# Patient Record
Sex: MALE | Race: WHITE | URBAN - METROPOLITAN AREA
[De-identification: names, ages, dates, MRNs, and addresses within clinical notes are randomized per-mention and may not be internally consistent; named-entity substitution may affect disease eponyms.]

---

## 2017-11-20 ENCOUNTER — HOSPITAL ENCOUNTER (OUTPATIENT)
Dept: WOUND CARE | Age: 57
Discharge: HOME | End: 2017-11-20
Attending: PODIATRIST | Admitting: PODIATRIST

## 2017-11-20 VITALS
DIASTOLIC BLOOD PRESSURE: 85 MMHG | TEMPERATURE: 97.2 F | RESPIRATION RATE: 20 BRPM | SYSTOLIC BLOOD PRESSURE: 164 MMHG | HEART RATE: 107 BPM | WEIGHT: 315 LBS | BODY MASS INDEX: 55.35 KG/M2

## 2017-11-20 RX ORDER — LISINOPRIL AND HYDROCHLOROTHIAZIDE 20; 12.5 MG/1; MG/1
2 TABLET ORAL DAILY
COMMUNITY

## 2017-11-20 RX ORDER — LEVOTHYROXINE SODIUM 88 UG/1
88 TABLET ORAL DAILY
COMMUNITY

## 2017-11-20 RX ORDER — AMOXICILLIN AND CLAVULANATE POTASSIUM 500; 125 MG/1; MG/1
1 TABLET, FILM COATED ORAL 2 TIMES DAILY
Qty: 28 TABLET | Refills: 0 | Status: SHIPPED | OUTPATIENT
Start: 2017-11-20 | End: 2017-12-04

## 2017-11-20 NOTE — PROGRESS NOTES
[facility] Progress Note      Dawit Bain     : 1960    DATE OF VISIT:  2017    Subjective:     Dawit Bain is a 61 y.o. male who has a chief complaint of a venous ulcer located on the bilateral leg. Has been wearing compression but weeping became worse and referred to [address] [place] [address],[address]. Current complaint of pain in this ulcer? No.            Mr. Shanelle Pinedo has a past medical history of Cellulitis; Diabetic foot ulcers ([address] and Hypertension. He has a past surgical history that includes other surgical history (Left, 14). His family history includes Diabetes in his brother; Heart Failure in his mother; Hypertension in his father; Stroke in his father. Mr. Shanelle Pinedo reports that he has never smoked. He does not have any smokeless tobacco history on file. He reports that he does not drink alcohol or use drugs. His current medication list consists of Folinic Acid-Vit B6-Vit B12, amoxicillin-clavulanate, levothyroxine, lisinopril-hydrochlorothiazide, metoprolol tartrate, and potassium chloride. Allergies: Review of patient's allergies indicates no known allergies. Pertinent items from the review of systems are discussed in the HPI; the remainder of the ROS was reviewed and is negative.        Objective:     BP (!) 164/85   Pulse 107   Temp 97.2 °F (36.2 °C) (Oral)   Resp 20   Wt (!) 364 lb (165.1 kg)   BMI 55.35 kg/m²   General Appearance: alert and oriented to person, place and time, well developed and well- nourished, in no acute distress  Skin: warm and dry, no rash or erythema  Head: normocephalic and atraumatic  Eyes: pupils equal, round, and reactive to light, extraocular eye movements intact, conjunctivae normal  ENT: tympanic membrane, external ear and ear canal normal bilaterally, nose without deformity, nasal mucosa and turbinates normal without polyps  Neck: supple and non-tender without mass, no thyromegaly or thyroid nodules, no cervical condition(s): venous leg ulcer bilateral, Venous insufficiency, cellulitis right LE. Factors contributing to occurrence and/or persistence of the chronic ulcer include edema, venous stasis, lymphedema, diabetes, poor glucose control, chronic pressure, decreased mobility, shear force and obesity. Sharp debridement is not indicated today, based upon the exam findings in the ulcer(s) above. Discharge plan:     Treatment in the wound care center today:  . Home treatment: good handwashing before and after any dressing changes. Cleanse ulcer with saline or soap & water before dressing change. May use Vaseline (petrolatum), Aquaphor, Aveeno, CeraVe, Cetaphil, Eucerin, Lubriderm, etc for dry skin. Dressing type for home: Per physician order, Bilateral application of multilayer compression wrap was performed in the wound care center today, to help manage edema, stasis dermatitis, and/or venous ulcers. Written discharge instructions given to patient. Offload ulcer(s) as directed. Elevate leg(s) as directed. Follow up in 1 week.        Rx Augmentin        Electronically signed by Laqueta Mcburney, DPM on 11/20/2017 at 3:36 PM.

## 2017-11-22 ENCOUNTER — HOSPITAL ENCOUNTER (OUTPATIENT)
Dept: WOUND CARE | Age: 57
Discharge: HOME | End: 2017-11-22
Attending: PODIATRIST | Admitting: PODIATRIST

## 2017-11-22 ENCOUNTER — HOSPITAL ENCOUNTER (OUTPATIENT)
Dept: WOUND CARE | Age: 57
Discharge: HOME | End: 2017-11-22
Attending: INTERNAL MEDICINE | Admitting: INTERNAL MEDICINE

## 2017-11-22 VITALS
SYSTOLIC BLOOD PRESSURE: 153 MMHG | RESPIRATION RATE: 18 BRPM | DIASTOLIC BLOOD PRESSURE: 72 MMHG | HEART RATE: 100 BPM | TEMPERATURE: 98 F | HEIGHT: 68 IN

## 2017-11-22 DIAGNOSIS — I89.0 LYMPHEDEMA OF BOTH LOWER EXTREMITIES: ICD-10-CM

## 2017-11-22 DIAGNOSIS — I10 ESSENTIAL HYPERTENSION: ICD-10-CM

## 2017-11-22 DIAGNOSIS — E66.01 MORBID OBESITY (HCC): ICD-10-CM

## 2017-11-22 DIAGNOSIS — L92.9 HYPERGRANULATION: ICD-10-CM

## 2017-11-22 PROCEDURE — 17250 CHEM CAUT OF GRANLTJ TISSUE: CPT | Performed by: INTERNAL MEDICINE

## 2017-11-22 PROCEDURE — 29581 APPL MULTLAYER CMPRN SYS LEG: CPT | Performed by: INTERNAL MEDICINE

## 2017-11-22 PROCEDURE — 99213 OFFICE O/P EST LOW 20 MIN: CPT | Performed by: INTERNAL MEDICINE

## 2017-11-22 NOTE — PLAN OF CARE
Problem: Wound:  Goal: Will show signs of wound healing; wound closure and no evidence of infection  Will show signs of wound healing; wound closure and no evidence of infection   Outcome: Ongoing  Pt. With copious amount of drainage noted from BLE & also new areas noted to Right dorsal base of toes. Ag Nitrate used to BLE per Dr. Rashad Murray, Will add Triamcinolone/zinc to reddened & Ag Nitrated areas, otherwise cont. With current wound care regime. Toes wrapped per MD. Reinforced importance of exercise, elevation & compression to help with circulation, edema control & wound healing. F/u in [address] [place] [address],[address] on Monday with Dr. Keara Merrill as scheduled. Pt. Continuing antibiotic as ordered. Discharge instructions reviewed with patient, all questions answered, copy given to patient. Dressings were applied to all wounds per M.D. Instructions at this visit.

## 2017-11-27 ENCOUNTER — HOSPITAL ENCOUNTER (OUTPATIENT)
Dept: WOUND CARE | Age: 57
Discharge: HOME | End: 2017-11-27
Attending: PODIATRIST | Admitting: PODIATRIST

## 2017-11-27 VITALS
TEMPERATURE: 97.2 F | WEIGHT: 315 LBS | HEART RATE: 91 BPM | BODY MASS INDEX: 47.74 KG/M2 | SYSTOLIC BLOOD PRESSURE: 126 MMHG | DIASTOLIC BLOOD PRESSURE: 77 MMHG | HEIGHT: 68 IN | RESPIRATION RATE: 18 BRPM

## 2017-11-27 DIAGNOSIS — I83.222 VARICOSE VEINS OF LEFT LOWER EXTREMITY WITH BOTH ULCER OF CALF AND INFLAMMATION (HCC): ICD-10-CM

## 2017-11-27 DIAGNOSIS — L97.219 VARICOSE VEINS OF RIGHT LOWER EXTREMITY WITH BOTH ULCER OF CALF AND INFLAMMATION (HCC): ICD-10-CM

## 2017-11-27 DIAGNOSIS — I83.212 VARICOSE VEINS OF RIGHT LOWER EXTREMITY WITH BOTH ULCER OF CALF AND INFLAMMATION (HCC): ICD-10-CM

## 2017-11-27 DIAGNOSIS — L97.229 VARICOSE VEINS OF LEFT LOWER EXTREMITY WITH BOTH ULCER OF CALF AND INFLAMMATION (HCC): ICD-10-CM

## 2017-11-27 DIAGNOSIS — I87.2 VENOUS INSUFFICIENCY: ICD-10-CM

## 2017-11-27 NOTE — PROGRESS NOTES
non-tender without mass, no thyromegaly or thyroid nodules, no cervical lymphadenopathy  Pulmonary/Chest: clear to auscultation bilaterally- no wheezes, rales or rhonchi, normal air movement, no respiratory distress  Cardiovascular: normal rate, regular rhythm, normal S1 and S2, no murmurs, rubs, clicks, or gallops, distal pulses intact, no carotid bruits  Abdomen: soft, non-tender, non-distended, normal bowel sounds, no masses or organomegaly. Dorsalis pedis pulse left palpable  Posterior tibial pulse left palpable  Dorsalis pedis pulse right palpable  Posterior tibial pulse right palpable      Ulcers on the left and right lower leg with no fibrotic tissue, red granulation tissue, mild serous drainage, no hyperkeratotic rim, no undermining, no tunneling, no purulence, no malodor, no eschar, minimal periwound maceration, mild to moderate periwound erythema, mild edema, no crepitus, no increase in skin temperature, ulcers probe to soft tissue only    Decreased edema bilateral LE. Venous dermatitis bilateral LE. Today's ulcer measurements are in the wound documentation flowsheet.      Wound 11/20/17 #10 right lower leg VLU partial (onset 6/10/2017) blister-Wound Length (cm): 9 cm  Wound 11/20/17 #11 left lower leg VLU partial (onset 6/10/2017) blisters -Wound Length (cm): 16.5 cm  [REMOVED] Wound 11/22/17 #12- R dorsal base of toes-cluster, fungal, partial thickness, onset 11/17, gradually appeared-Wound Length (cm): 0 cm  Wound 11/20/17 #10 right lower leg VLU partial (onset 6/10/2017) blister-Wound Width (cm): 34 cm  Wound 11/20/17 #11 left lower leg VLU partial (onset 6/10/2017) blisters -Wound Width (cm): 19 cm  [REMOVED] Wound 11/22/17 #12- R dorsal base of toes-cluster, fungal, partial thickness, onset 11/17, gradually appeared-Wound Width (cm): 0 cm  Wound 11/20/17 #10 right lower leg VLU partial (onset 6/10/2017) blister-Wound Depth (cm) : 0.1  Wound 11/20/17 #11 left lower leg VLU partial (onset compression wraps. New wounds that developed on the right foot last Wednesday are resolved.            Electronically signed by Rojas Tracy DPM on 11/27/2017 at 4:35 PM.

## 2017-11-27 NOTE — PLAN OF CARE
Problem: Wound:  Goal: Will show signs of wound healing; wound closure and no evidence of infection  Will show signs of wound healing; wound closure and no evidence of infection   Outcome: Ongoing  Pt to the [address] [place] [address] for follow up appointment. Wounds improving. Pt to continue with bilateral lower leg compression wraps. Pt to follow up in the [address] [place] [address] in 1 week. Discharge instructions reviewed with patient, all questions answered, copy given to patient. Dressings were applied to all wounds per M.D. Instructions at this visit.

## 2017-11-29 PROBLEM — E66.01 MORBID OBESITY (HCC): Status: ACTIVE | Noted: 2017-11-29

## 2017-11-29 PROBLEM — E03.9 HYPOTHYROIDISM: Status: ACTIVE | Noted: 2017-11-29

## 2017-11-29 PROBLEM — I10 ESSENTIAL HYPERTENSION: Status: ACTIVE | Noted: 2017-11-29

## 2017-11-29 PROBLEM — I89.0 LYMPHEDEMA OF BOTH LOWER EXTREMITIES: Status: ACTIVE | Noted: 2017-11-29

## 2017-11-29 PROBLEM — L92.9 HYPERGRANULATION: Status: ACTIVE | Noted: 2017-11-29

## 2017-11-30 NOTE — PROGRESS NOTES
[facility] Progress Note    Elsie Mayorga     : 1960    DATE OF VISIT:  2017    Subjective:     Elsie Mayorga is a 61 y.o. male who has a venous and lymphedema ulcer located on the B/L lower legs. Current complaint of pain in this ulcer? yes. Quality of pain: aching and burning  Timing: intermittent, stable  Severity: moderate  Associated Signs/Symptoms:  edema, erythema and drainage  Other significant symptoms or pertinent ulcer history: Mr. Zander Zavala had just been scheduled for a nurse visit today, for dressing and compression wrap changes, but with new areas of ulceration on his right foot, I was asked to see him. Notes moderately heavy drainage on the left, very heavy on the right, to the point that his sock was becoming wet. Has been taking Augmentin as prescribed, no F/C/D, no N/V/D, new rash. Additional ulcer(s) noted? no.      Mr. Zander Zavala has a past medical history of Cellulitis; Diabetic foot ulcers ([address] and Osteomyelitis of left foot ([facility] He has a past surgical history that includes Toe amputation (Left, 2014). His family history includes Diabetes in his brother; Heart Failure in his mother; Hypertension in his father; Stroke in his father. Mr. Zander Zavala reports that he has never smoked. He has never used smokeless tobacco. He reports that he does not drink alcohol or use drugs. His current medication list consists of Folinic Acid-Vit B6-Vit B12, amoxicillin-clavulanate, levothyroxine, lisinopril-hydrochlorothiazide, metoprolol tartrate, and potassium chloride. Allergies: Review of patient's allergies indicates no known allergies. Pertinent items from the review of systems are discussed in the HPI; the remainder of the ROS was reviewed and is negative.      Objective:     T 98, , /72, RR 18    Constitutional:  well-developed, well-nourished, overweight, NAD  Psychiatric:  oriented to person, place and time; mood and affect

## 2017-12-04 ENCOUNTER — HOSPITAL ENCOUNTER (OUTPATIENT)
Dept: WOUND CARE | Age: 57
Discharge: HOME | End: 2017-12-04
Attending: PODIATRIST | Admitting: PODIATRIST

## 2017-12-04 VITALS
HEIGHT: 68 IN | WEIGHT: 315 LBS | DIASTOLIC BLOOD PRESSURE: 71 MMHG | RESPIRATION RATE: 16 BRPM | BODY MASS INDEX: 47.74 KG/M2 | TEMPERATURE: 97.9 F | HEART RATE: 95 BPM | SYSTOLIC BLOOD PRESSURE: 133 MMHG

## 2017-12-04 NOTE — PROGRESS NOTES
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 Factors contributing to occurrence and/or persistence of the chronic ulcer include edema, venous stasis and obesity. Sharp debridement is not indicated today, based upon the exam findings in the ulcer(s) above. Discharge plan:     Treatment in the wound care center today: Wound 11/20/17 #10 right lower leg VLU partial (onset 6/10/2017) blister-Dressing/Treatment: Other (Comment) (transfer ag, intradry ag ,coban 2 )  Wound 11/20/17 #11 left lower leg VLU partial (onset 6/10/2017) blisters -Dressing/Treatment: Other (Comment) (transfer ag, intradry ag ,coban 2 ). Home treatment: good handwashing before and after any dressing changes. Cleanse ulcer with saline or soap & water before dressing change. May use Vaseline (petrolatum), Aquaphor, Aveeno, CeraVe, Cetaphil, Eucerin, Lubriderm, etc for dry skin. Dressing type for home: Per physician order, Bilateral application of multilayer compression wrap was performed in the wound care center today, to help manage edema, stasis dermatitis, and/or venous ulcers. Written discharge instructions given to patient. Offload ulcer(s) as directed. Elevate leg(s) as directed. Follow up in 1 week.                Electronically signed by Tere Cadet DPM on 12/4/2017 at 5:09 PM.

## 2017-12-11 ENCOUNTER — HOSPITAL ENCOUNTER (OUTPATIENT)
Dept: WOUND CARE | Age: 57
Discharge: HOME | End: 2017-12-11
Attending: PODIATRIST | Admitting: PODIATRIST

## 2017-12-11 VITALS
TEMPERATURE: 97.5 F | RESPIRATION RATE: 16 BRPM | DIASTOLIC BLOOD PRESSURE: 83 MMHG | BODY MASS INDEX: 47.74 KG/M2 | WEIGHT: 315 LBS | HEIGHT: 68 IN | HEART RATE: 94 BPM | SYSTOLIC BLOOD PRESSURE: 142 MMHG

## 2017-12-11 NOTE — PROGRESS NOTES
wheezes, rales or rhonchi, normal air movement, no respiratory distress  Cardiovascular: normal rate, regular rhythm, normal S1 and S2, no murmurs, rubs, clicks, or gallops, distal pulses intact, no carotid bruits  Abdomen: soft, non-tender, non-distended, normal bowel sounds, no masses or organomegaly. Dorsalis pedis pulse left palpable  Posterior tibial pulse left palpable  Dorsalis pedis pulse right palpable  Posterior tibial pulse right palpable      Ulcers on the left and right lower leg with no fibrotic tissue, red granulation tissue, mild serous drainage, no hyperkeratotic rim, no undermining, no tunneling, no purulence, no malodor, no eschar, minimal periwound maceration, mild to moderate periwound erythema, mild edema, no crepitus, no increase in skin temperature, ulcers probe to soft tissue only    Decreased edema bilateral LE. Venous dermatitis bilateral LE. Today's ulcer measurements are in the wound documentation flowsheet.      Wound 11/20/17 #10 right lower leg venous leg ulcer partial (onset 6/10/2017) blister-Wound Length (cm): 0.6 cm  Wound 11/20/17 #11 left lower leg Venous leg ulcer partial (onset 6/10/2017) blisters -Wound Length (cm): 15 cm  Wound 11/20/17 #10 right lower leg venous leg ulcer partial (onset 6/10/2017) blister-Wound Width (cm): 1.6 cm  Wound 11/20/17 #11 left lower leg Venous leg ulcer partial (onset 6/10/2017) blisters -Wound Width (cm): 11 cm  Wound 11/20/17 #10 right lower leg venous leg ulcer partial (onset 6/10/2017) blister-Wound Depth (cm) : 0.1  Wound 11/20/17 #11 left lower leg Venous leg ulcer partial (onset 6/10/2017) blisters -Wound Depth (cm) : 0.1    LABS  No results found for: LABA1C      Assessment:     Patient Active Problem List   Diagnosis Code    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

## 2017-12-18 ENCOUNTER — HOSPITAL ENCOUNTER (OUTPATIENT)
Dept: WOUND CARE | Age: 57
Discharge: HOME | End: 2017-12-18
Attending: PODIATRIST | Admitting: PODIATRIST

## 2017-12-18 VITALS
DIASTOLIC BLOOD PRESSURE: 86 MMHG | SYSTOLIC BLOOD PRESSURE: 162 MMHG | RESPIRATION RATE: 18 BRPM | HEIGHT: 68 IN | WEIGHT: 315 LBS | BODY MASS INDEX: 47.74 KG/M2 | TEMPERATURE: 97.4 F | HEART RATE: 107 BPM

## 2017-12-18 ASSESSMENT — PAIN SCALES - GENERAL: PAINLEVEL_OUTOF10: 0

## 2017-12-19 NOTE — PLAN OF CARE
Problem: Wound:  Goal: Will show signs of wound healing; wound closure and no evidence of infection  Will show signs of wound healing; wound closure and no evidence of infection   Outcome: Ongoing  Pt to the [address] [place] [address] for follow up appointment. Wounds improving. Pt to continue with weekly compression wrap. Pt to have nurse visit dressing change on 12/22/17 and follow up in the [address] [place] [address] with Dr Severa North on 12/29/17. Discharge instructions reviewed with patient, all questions answered, copy given to patient. Dressings were applied to all wounds per M.D. Instructions at this visit.

## 2017-12-22 ENCOUNTER — HOSPITAL ENCOUNTER (OUTPATIENT)
Dept: WOUND CARE | Age: 57
Discharge: HOME | End: 2017-12-22
Attending: PODIATRIST | Admitting: PODIATRIST

## 2017-12-22 VITALS
BODY MASS INDEX: 47.74 KG/M2 | TEMPERATURE: 97.8 F | SYSTOLIC BLOOD PRESSURE: 159 MMHG | WEIGHT: 315 LBS | DIASTOLIC BLOOD PRESSURE: 82 MMHG | HEART RATE: 90 BPM | HEIGHT: 68 IN | RESPIRATION RATE: 18 BRPM

## 2017-12-22 ASSESSMENT — PAIN SCALES - GENERAL: PAINLEVEL_OUTOF10: 0

## 2017-12-29 ENCOUNTER — HOSPITAL ENCOUNTER (OUTPATIENT)
Dept: WOUND CARE | Age: 57
Discharge: HOME | End: 2017-12-29
Attending: PODIATRIST | Admitting: PODIATRIST

## 2017-12-29 VITALS
HEART RATE: 99 BPM | HEIGHT: 68 IN | RESPIRATION RATE: 17 BRPM | BODY MASS INDEX: 47.74 KG/M2 | DIASTOLIC BLOOD PRESSURE: 67 MMHG | SYSTOLIC BLOOD PRESSURE: 122 MMHG | TEMPERATURE: 98.1 F | WEIGHT: 315 LBS

## 2017-12-29 ASSESSMENT — PAIN SCALES - GENERAL: PAINLEVEL_OUTOF10: 0

## 2018-01-04 ENCOUNTER — HOSPITAL ENCOUNTER (OUTPATIENT)
Dept: WOUND CARE | Age: 58
Discharge: HOME | End: 2018-01-04
Attending: PODIATRIST | Admitting: PODIATRIST

## 2018-01-04 VITALS
RESPIRATION RATE: 20 BRPM | TEMPERATURE: 98.5 F | BODY MASS INDEX: 54.25 KG/M2 | WEIGHT: 315 LBS | HEART RATE: 79 BPM | DIASTOLIC BLOOD PRESSURE: 72 MMHG | SYSTOLIC BLOOD PRESSURE: 133 MMHG

## 2018-01-04 RX ORDER — METOPROLOL SUCCINATE 25 MG/1
25 TABLET, EXTENDED RELEASE ORAL DAILY
COMMUNITY

## 2018-01-04 NOTE — PLAN OF CARE
Problem: Wound:  Goal: Will show signs of wound healing; wound closure and no evidence of infection  Will show signs of wound healing; wound closure and no evidence of infection   Outcome: Ongoing  Pt. Seen for nurse visit/dressing change. Wounds appear healed. Dressings reapplied per MD orders. Pt. To f/u on 1/8/17 with Dr. Eleazar Erazo as scheduled. Reinforced importance of elevation & compression to help with circulation, edema control & wound healing. Discharge instructions reviewed with patient, all questions answered, copy given to patient.

## 2018-01-04 NOTE — PROGRESS NOTES
[facility] Progress Note      Magnolia Parikh     : 1960    DATE OF VISIT:  2017    Subjective:     Magnolia Parikh is a 61 y.o. male who has a chief complaint of a venous ulcer located on the bilateral leg. States legs feel much better at this time. Current complaint of pain in this ulcer? No.      Mr. Shanelle Perry has a past medical history of Cellulitis; Diabetic foot ulcers ([address] and Osteomyelitis of left foot ([address] He has a past surgical history that includes Toe amputation (Left, 2014). His family history includes Diabetes in his brother; Heart Failure in his mother; Hypertension in his father; Stroke in his father. Mr. Shanelle Perry reports that he has never smoked. He has never used smokeless tobacco. He reports that he does not drink alcohol or use drugs. His current medication list consists of Folinic Acid-Vit B6-Vit B12, levothyroxine, lisinopril-hydrochlorothiazide, metoprolol tartrate, and potassium chloride. Allergies: Review of patient's allergies indicates no known allergies. Pertinent items from the review of systems are discussed in the HPI; the remainder of the ROS was reviewed and is negative.        Objective:     /67   Pulse 99   Temp 98.1 °F (36.7 °C) (Oral)   Resp 17   Ht 5' 8\" (1.727 m)   Wt (!) 359 lb (162.8 kg)   BMI 54.59 kg/m²   General Appearance: alert and oriented to person, place and time, well developed and well- nourished, in no acute distress  Skin: warm and dry, no rash or erythema  Head: normocephalic and atraumatic  Eyes: pupils equal, round, and reactive to light, extraocular eye movements intact, conjunctivae normal  ENT: tympanic membrane, external ear and ear canal normal bilaterally, nose without deformity, nasal mucosa and turbinates normal without polyps  Neck: supple and non-tender without mass, no thyromegaly or thyroid nodules, no cervical lymphadenopathy  Pulmonary/Chest: clear to auscultation

## 2018-01-08 ENCOUNTER — HOSPITAL ENCOUNTER (OUTPATIENT)
Dept: WOUND CARE | Age: 58
Discharge: HOME | End: 2018-01-08
Attending: PODIATRIST | Admitting: PODIATRIST

## 2018-01-08 VITALS
WEIGHT: 315 LBS | HEIGHT: 68 IN | BODY MASS INDEX: 47.74 KG/M2 | TEMPERATURE: 97.4 F | RESPIRATION RATE: 18 BRPM | DIASTOLIC BLOOD PRESSURE: 79 MMHG | HEART RATE: 94 BPM | SYSTOLIC BLOOD PRESSURE: 143 MMHG

## 2018-01-08 ASSESSMENT — PAIN SCALES - GENERAL: PAINLEVEL_OUTOF10: 0

## 2018-01-08 NOTE — PROGRESS NOTES
wheezes, rales or rhonchi, normal air movement, no respiratory distress  Cardiovascular: normal rate, regular rhythm, normal S1 and S2, no murmurs, rubs, clicks, or gallops, distal pulses intact, no carotid bruits  Abdomen: soft, non-tender, non-distended, normal bowel sounds, no masses or organomegaly. Dorsalis pedis pulse left palpable  Posterior tibial pulse left palpable  Dorsalis pedis pulse right palpable  Posterior tibial pulse right palpable      Ulcers on the left and right lower leg epithelialized. No drainage noted. Mild edema bilateral LE. Venous dermatitis bilateral LE. Today's ulcer measurements are in the wound documentation flowsheet.      [REMOVED] Wound 11/20/17 #10 right lower leg venous leg ulcer partial (onset 6/10/2017) blister-Wound Length (cm): 0 cm  [REMOVED] Wound 11/20/17 #11 left lower leg Venous leg ulcer partial (onset 6/10/2017) blisters -Wound Length (cm): 0 cm  [REMOVED] Wound 11/20/17 #10 right lower leg venous leg ulcer partial (onset 6/10/2017) blister-Wound Width (cm): 0 cm  [REMOVED] Wound 11/20/17 #11 left lower leg Venous leg ulcer partial (onset 6/10/2017) blisters -Wound Width (cm): 0 cm  [REMOVED] Wound 11/20/17 #10 right lower leg venous leg ulcer partial (onset 6/10/2017) blister-Wound Depth (cm) : 0  [REMOVED] Wound 11/20/17 #11 left lower leg Venous leg ulcer partial (onset 6/10/2017) blisters -Wound Depth (cm) : 0    LABS  No results found for: LABA1C      Assessment:     Patient Active Problem List   Diagnosis Code    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 Factors contributing to occurrence and/or persistence of the chronic ulcer include edema, venous stasis and obesity. Sharp debridement is not indicated today, based upon the exam findings in the ulcer(s) above. Discharge plan:     Treatment in the wound care center today: [REMOVED] Wound 11/20/17 #10 right lower leg venous leg ulcer partial (onset 6/10/2017) blister-Dressing/Treatment: Other (Comment) (double layer \"e\" tubigrip)  [REMOVED] Wound 11/20/17 #11 left lower leg Venous leg ulcer partial (onset 6/10/2017) blisters -Dressing/Treatment: Other (Comment) (double layer \"e\" tubigrip). Home treatment: good handwashing before and after any dressing changes. Cleanse ulcer with saline or soap & water before dressing change. May use Vaseline (petrolatum), Aquaphor, Aveeno, CeraVe, Cetaphil, Eucerin, Lubriderm, etc for dry skin. Dressing type for home: Resume compression stockings. Written discharge instructions given to patient. Offload ulcer(s) as directed. Elevate leg(s) as directed. Follow up in [facility] as needed.                Electronically signed by Ambar Leung DPM on 1/8/2018 at 5:26 PM.

## 2019-08-12 ENCOUNTER — APPOINTMENT (OUTPATIENT)
Dept: WOUND CARE | Age: 59
End: 2019-08-12
Payer: COMMERCIAL

## 2019-08-12 ENCOUNTER — HOSPITAL ENCOUNTER (OUTPATIENT)
Dept: WOUND CARE | Age: 59
Discharge: HOME | End: 2019-08-12
Payer: COMMERCIAL

## 2019-08-12 VITALS
RESPIRATION RATE: 18 BRPM | TEMPERATURE: 97.2 F | HEIGHT: 67 IN | HEART RATE: 80 BPM | SYSTOLIC BLOOD PRESSURE: 122 MMHG | WEIGHT: 315 LBS | BODY MASS INDEX: 49.44 KG/M2 | DIASTOLIC BLOOD PRESSURE: 67 MMHG

## 2019-08-12 PROCEDURE — 99212 OFFICE O/P EST SF 10 MIN: CPT

## 2019-08-12 PROCEDURE — 29581 APPL MULTLAYER CMPRN SYS LEG: CPT

## 2019-08-12 RX ORDER — LIDOCAINE 40 MG/G
CREAM TOPICAL ONCE
Status: DISCONTINUED | OUTPATIENT
Start: 2019-08-12 | End: 2019-08-13 | Stop reason: HOSPADM

## 2019-08-12 ASSESSMENT — PAIN SCALES - GENERAL: PAINLEVEL_OUTOF10: 0

## 2019-08-12 NOTE — PROGRESS NOTES
[facility] Progress Note      Lazarus Cancino     : 1960    DATE OF VISIT:  2019    Subjective:     Lazarus Cancino is a 60 y.o. male who has a chief complaint of a venous ulcer located on the left leg. Had been doing well with his legs until developed increased swelling and then wounds developed. He was not able to get them to improve at home. Current complaint of pain in this ulcer? No.      Mr. Guadalupe Rizzo has a past medical history of Cellulitis, Hypertension, and Osteomyelitis of left foot ([address] He has a past surgical history that includes Toe amputation (Left, 2014). His family history includes Diabetes in his brother; Heart Failure in his mother; Hypertension in his father; Stroke in his father. Mr. Guadalupe Rizzo reports that he has never smoked. He has never used smokeless tobacco. He reports that he does not drink alcohol or use drugs. His current medication list consists of Folinic Acid-Vit B6-Vit B12, levothyroxine, lisinopril-hydrochlorothiazide, metoprolol succinate, and potassium chloride. Allergies: Patient has no known allergies. Pertinent items from the review of systems are discussed in the HPI; the remainder of the ROS was reviewed and is negative.        Objective:     /67   Pulse 80   Temp 97.2 °F (36.2 °C) (Oral)   Resp 18   Ht 5' 7\" (1.702 m)   Wt (!) 344 lb (156 kg)   BMI 53.88 kg/m²   General Appearance: alert and oriented to person, place and time, well developed and well- nourished, in no acute distress  Skin: warm and dry, no rash or erythema  Head: normocephalic and atraumatic  Eyes: pupils equal, round, and reactive to light, extraocular eye movements intact, conjunctivae normal  ENT: tympanic membrane, external ear and ear canal normal bilaterally, nose without deformity, nasal mucosa and turbinates normal without polyps  Neck: supple and non-tender without mass, no thyromegaly or thyroid nodules, no cervical condition(s): venous leg ulcer left, Venous insufficiency. Factors contributing to occurrence and/or persistence of the chronic ulcer include edema, venous stasis, lymphedema and smoking. Sharp debridement is not indicated today, based upon the exam findings in the ulcer(s) above. Discharge plan:     Treatment in the wound care center today:  . Home treatment: good handwashing before and after any dressing changes. Cleanse ulcer with saline or soap & water before dressing change. May use Vaseline (petrolatum), Aquaphor, Aveeno, CeraVe, Cetaphil, Eucerin, Lubriderm, etc for dry skin. Dressing type for home: Per physician order, left application of multilayer compression wrap was performed in the wound care center today, to help manage edema, stasis dermatitis, and/or venous ulcers. Leave primary dressing and multi-layer wrap(s) in place until the next appointment. Written discharge instructions given to patient. Offload ulcer(s) as directed. Elevate leg(s) as directed. Follow up in 1 week.              Electronically signed by López Trujillo DPM on 8/12/2019 at 3:02 PM.

## 2019-08-19 ENCOUNTER — HOSPITAL ENCOUNTER (OUTPATIENT)
Dept: WOUND CARE | Age: 59
Discharge: HOME | End: 2019-08-19
Payer: COMMERCIAL

## 2019-08-19 VITALS
TEMPERATURE: 97.1 F | SYSTOLIC BLOOD PRESSURE: 112 MMHG | RESPIRATION RATE: 18 BRPM | HEART RATE: 80 BPM | DIASTOLIC BLOOD PRESSURE: 68 MMHG | HEIGHT: 67 IN | WEIGHT: 315 LBS | BODY MASS INDEX: 49.44 KG/M2

## 2019-08-19 PROCEDURE — 29581 APPL MULTLAYER CMPRN SYS LEG: CPT

## 2019-08-19 RX ORDER — LIDOCAINE 40 MG/G
CREAM TOPICAL ONCE
Status: DISCONTINUED | OUTPATIENT
Start: 2019-08-19 | End: 2019-08-20 | Stop reason: HOSPADM

## 2019-08-19 ASSESSMENT — PAIN SCALES - GENERAL: PAINLEVEL_OUTOF10: 0

## 2019-08-19 NOTE — PLAN OF CARE
[facility] Summary     1. General --     Active wound etiologies:                     Venous leg ulcer                PCP and pertinent consultants: KIRSTY Koch - [facility]                                                                    Other plans for overall health:                             [address]:            [address]:            N/a            Most recent [facility] lab results:     No results found for: LABA1C        Lab Results   Component Value Date     LABALBU 4.4 02/24/2013            Lab Results   Component Value Date     CREATININE 0.9 12/11/2014            Lab Results   Component Value Date     HGB 10.3 (L) 12/11/2014         2. Circulatory status, if lower extremity ulcer --     Most recent [facility]   11/2017                            Most recent arterial imaging:              N/a     Most recent arterial Rx:                      N/a     Current management of edema:        Tubigrips, compression garments     Most recent venous imaging:              N/a     3.         Debridement --     Debridement methods, past or present:         N/a     Pertinent surgical history for wound(s):          N/a     4.         Infection --      Recent culture results:            N/a     Recent imaging:                      N/a     Recent antibiotic Rx:               N/a     5. Topical therapies --     Previous dressings on current wound(s):            Current dressings being used:                       Polymem, interdry ag, compri 2     6. Offloading --     Pressure ulcer offloading:            Neuropathic ulcer offloading:       Pt to the [facility] for follow up appointment. Pt to have weekly compression wrap applied to left lower leg. Instructed patient on importance of elevating legs. Pt to follow up in the [facility] on Thursday or Friday for wound reassessment and in 1 week with Dr Floretta Kayser.   Discharge instructions reviewed

## 2019-08-19 NOTE — PROGRESS NOTES
[facility] Progress Note      Sanket Myers     : 1960    DATE OF VISIT:  2019    Subjective:     Sanket Myers is a 60 y.o. male who has a chief complaint of a venous ulcer located on the left leg. States did OK with the compression wrap. Current complaint of pain in this ulcer? No.      Mr. Maggie Armas has a past medical history of Cellulitis, Hypertension, and Osteomyelitis of left foot ([address] He has a past surgical history that includes Toe amputation (Left, 2014). His family history includes Diabetes in his brother; Heart Failure in his mother; Hypertension in his father; Stroke in his father. Mr. Maggie Armas reports that he has never smoked. He has never used smokeless tobacco. He reports that he does not drink alcohol or use drugs. His current medication list consists of Folinic Acid-Vit B6-Vit B12, levothyroxine, lisinopril-hydrochlorothiazide, metoprolol succinate, and potassium chloride. Allergies: Patient has no known allergies. Pertinent items from the review of systems are discussed in the HPI; the remainder of the ROS was reviewed and is negative.        Objective:     /68   Pulse 80   Temp 97.1 °F (36.2 °C) (Oral)   Resp 18   Ht 5' 7\" (1.702 m)   Wt (!) 341 lb 6.4 oz (154.9 kg)   BMI 53.47 kg/m²   General Appearance: alert and oriented to person, place and time, well developed and well- nourished, in no acute distress  Skin: warm and dry, no rash or erythema  Head: normocephalic and atraumatic  Eyes: pupils equal, round, and reactive to light, extraocular eye movements intact, conjunctivae normal  ENT: tympanic membrane, external ear and ear canal normal bilaterally, nose without deformity, nasal mucosa and turbinates normal without polyps  Neck: supple and non-tender without mass, no thyromegaly or thyroid nodules, no cervical lymphadenopathy  Pulmonary/Chest: clear to auscultation bilaterally- no wheezes, rales or rhonchi, normal

## 2019-08-22 ENCOUNTER — HOSPITAL ENCOUNTER (OUTPATIENT)
Dept: WOUND CARE | Age: 59
Discharge: HOME | End: 2019-08-22
Payer: COMMERCIAL

## 2019-08-22 VITALS
HEIGHT: 67 IN | DIASTOLIC BLOOD PRESSURE: 59 MMHG | WEIGHT: 315 LBS | RESPIRATION RATE: 20 BRPM | HEART RATE: 77 BPM | SYSTOLIC BLOOD PRESSURE: 118 MMHG | TEMPERATURE: 97.8 F | BODY MASS INDEX: 49.44 KG/M2

## 2019-08-22 PROCEDURE — 29581 APPL MULTLAYER CMPRN SYS LEG: CPT

## 2019-08-26 ENCOUNTER — HOSPITAL ENCOUNTER (OUTPATIENT)
Dept: WOUND CARE | Age: 59
Discharge: HOME | End: 2019-08-26
Payer: COMMERCIAL

## 2019-08-26 VITALS
BODY MASS INDEX: 49.44 KG/M2 | HEIGHT: 67 IN | SYSTOLIC BLOOD PRESSURE: 126 MMHG | HEART RATE: 84 BPM | RESPIRATION RATE: 20 BRPM | WEIGHT: 315 LBS | TEMPERATURE: 97.5 F | DIASTOLIC BLOOD PRESSURE: 76 MMHG

## 2019-08-26 PROCEDURE — 29581 APPL MULTLAYER CMPRN SYS LEG: CPT

## 2019-08-26 RX ORDER — LIDOCAINE 40 MG/G
CREAM TOPICAL ONCE
Status: DISCONTINUED | OUTPATIENT
Start: 2019-08-26 | End: 2019-08-27 | Stop reason: HOSPADM

## 2019-08-26 ASSESSMENT — PAIN SCALES - GENERAL
PAINLEVEL_OUTOF10: 0
PAINLEVEL_OUTOF10: 0

## 2019-08-26 NOTE — PROGRESS NOTES
ulcer include edema, venous stasis, lymphedema and smoking. Sharp debridement is not indicated today, based upon the exam findings in the ulcer(s) above. Discharge plan:     Treatment in the wound care center today: Wound 08/12/19 #1, Left Lower Leg CLUSTER, Venous, Partial Thickness, Onset 7/1/19-Dressing/Treatment: Other (comment)(polymem ag,optilock,interdry ag,compri2,\"g\" spandagrip ). Home treatment: good handwashing before and after any dressing changes. Cleanse ulcer with saline or soap & water before dressing change. May use Vaseline (petrolatum), Aquaphor, Aveeno, CeraVe, Cetaphil, Eucerin, Lubriderm, etc for dry skin. Dressing type for home: Per physician order, left application of multilayer compression wrap was performed in the wound care center today, to help manage edema, stasis dermatitis, and/or venous ulcers. Leave primary dressing and multi-layer wrap(s) in place until the next appointment. Written discharge instructions given to patient. Offload ulcer(s) as directed. Elevate leg(s) as directed. Follow up in [facility] for [place] as instructed and with me in 2 weeks given holiday schedule.          Electronically signed by Davey Schafer DPM on 8/26/2019 at 12:51 PM.

## 2019-08-26 NOTE — PLAN OF CARE
[facility] Summary     1. General --     Active wound etiologies:                     Venous leg ulcer     PCP and pertinent consultants: Roxana Loyola, APRN - CNP         Other plans for overall health:                             [address]:            [address]:            N/a            Most recent [facility] lab results:     No results found for: LABA1C            Lab Results   Component Value Date     LABALBU 4.4 02/24/2013                Lab Results   Component Value Date     CREATININE 0.9 12/11/2014                Lab Results   Component Value Date     HGB 10.3 (L) 12/11/2014         2. Circulatory status, if lower extremity ulcer --     Most recent [place]   11/2017     Most recent arterial imaging:              N/a     Most recent arterial Rx:                      N/a     Current management of edema:        Tubigrips, compression garments     Most recent venous imaging:              N/a     3.         Debridement --     Debridement methods, past or present:         N/a     Pertinent surgical history for wound(s):          N/a     4.         Infection --      Recent culture results:            N/a     Recent imaging:                      N/a     Recent antibiotic Rx:               N/a     5. Topical therapies --     Previous dressings on current wound(s):            Current dressings being used:                       Polymem, interdry ag, compri 2     6. Offloading --     Pressure ulcer offloading:            Neuropathic ulcer offloading:       Pt to the [facility] for follow up appointment. Pt to have weekly compression wrap applied to left lower leg. Instructed patient on importance of elevating legs. Pt to follow up in the [facility] on Thursday for wound reassessment,  wound reassessment in 1 week, and follow up with Dr Floretta Kayser in 2 weeks with Dr Floretta Kayser.   Discharge instructions reviewed with patient, all questions answered, copy given to patient. Dressings were applied to all wounds per M.D. Instructions at this visit.

## 2019-08-29 ENCOUNTER — HOSPITAL ENCOUNTER (OUTPATIENT)
Dept: WOUND CARE | Age: 59
Discharge: HOME | End: 2019-08-29
Payer: COMMERCIAL

## 2019-08-29 VITALS
TEMPERATURE: 97 F | HEART RATE: 66 BPM | HEIGHT: 67 IN | BODY MASS INDEX: 49.44 KG/M2 | DIASTOLIC BLOOD PRESSURE: 60 MMHG | SYSTOLIC BLOOD PRESSURE: 121 MMHG | WEIGHT: 315 LBS | RESPIRATION RATE: 22 BRPM

## 2019-08-29 PROCEDURE — 29581 APPL MULTLAYER CMPRN SYS LEG: CPT

## 2019-09-05 ENCOUNTER — HOSPITAL ENCOUNTER (OUTPATIENT)
Dept: WOUND CARE | Age: 59
Discharge: HOME | End: 2019-09-05
Payer: COMMERCIAL

## 2019-09-05 VITALS
BODY MASS INDEX: 49.44 KG/M2 | DIASTOLIC BLOOD PRESSURE: 83 MMHG | WEIGHT: 315 LBS | HEIGHT: 67 IN | RESPIRATION RATE: 20 BRPM | TEMPERATURE: 98 F | HEART RATE: 78 BPM | SYSTOLIC BLOOD PRESSURE: 146 MMHG

## 2019-09-05 PROCEDURE — 29581 APPL MULTLAYER CMPRN SYS LEG: CPT

## 2019-09-05 ASSESSMENT — PAIN SCALES - GENERAL
PAINLEVEL_OUTOF10: 0
PAINLEVEL_OUTOF10: 0

## 2019-09-09 ENCOUNTER — HOSPITAL ENCOUNTER (OUTPATIENT)
Dept: WOUND CARE | Age: 59
Discharge: HOME | End: 2019-09-09
Payer: COMMERCIAL

## 2019-09-09 VITALS
DIASTOLIC BLOOD PRESSURE: 80 MMHG | WEIGHT: 315 LBS | HEART RATE: 86 BPM | HEIGHT: 67 IN | TEMPERATURE: 97.5 F | RESPIRATION RATE: 20 BRPM | BODY MASS INDEX: 49.44 KG/M2 | SYSTOLIC BLOOD PRESSURE: 107 MMHG

## 2019-09-09 PROCEDURE — 29581 APPL MULTLAYER CMPRN SYS LEG: CPT

## 2019-09-09 RX ORDER — LIDOCAINE 40 MG/G
CREAM TOPICAL ONCE
Status: DISCONTINUED | OUTPATIENT
Start: 2019-09-09 | End: 2019-09-10 | Stop reason: HOSPADM

## 2019-09-09 ASSESSMENT — PAIN SCALES - GENERAL: PAINLEVEL_OUTOF10: 0

## 2019-09-16 ENCOUNTER — HOSPITAL ENCOUNTER (OUTPATIENT)
Dept: WOUND CARE | Age: 59
Discharge: HOME | End: 2019-09-16
Payer: COMMERCIAL

## 2019-09-16 VITALS
RESPIRATION RATE: 18 BRPM | WEIGHT: 315 LBS | TEMPERATURE: 97 F | BODY MASS INDEX: 49.44 KG/M2 | DIASTOLIC BLOOD PRESSURE: 75 MMHG | HEIGHT: 67 IN | SYSTOLIC BLOOD PRESSURE: 132 MMHG | HEART RATE: 82 BPM

## 2019-09-16 PROCEDURE — 99212 OFFICE O/P EST SF 10 MIN: CPT

## 2019-09-16 ASSESSMENT — PAIN SCALES - GENERAL: PAINLEVEL_OUTOF10: 0

## 2019-09-16 NOTE — PLAN OF CARE
Pt to the [address] [place] [address],[address] for follow up appointment. Wound healed. Pt to wear spandagrip and continue to use compression pumps. Pt to follow up in the [address] [place] [address],[facility] Floor as needed. Discharge instructions reviewed with patient, all questions answered, copy given to patient. Dressings were applied to all wounds per M.D. Instructions at this visit.

## 2019-09-16 NOTE — PROGRESS NOTES
movement, no respiratory distress  Cardiovascular: normal rate, regular rhythm, normal S1 and S2, no murmurs, rubs, clicks, or gallops, distal pulses intact, no carotid bruits  Abdomen: soft, non-tender, non-distended, normal bowel sounds, no masses or organomegaly. Dorsalis pedis pulse left palpable  Posterior tibial pulse left palpable  Dorsalis pedis pulse right palpable  Posterior tibial pulse right palpable      Ulcer on the left lower leg epithelialized. No drainage noted. Edema bilateral LE. Venous dermatitis bilateral LE. Today's ulcer measurements are in the wound documentation flowsheet. 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 Factors contributing to occurrence and/or persistence of the chronic ulcer include edema, venous stasis, lymphedema and smoking. Sharp debridement is not indicated today, based upon the exam findings in the ulcer(s) above.       Discharge plan:     Treatment in the wound care center today: [REMOVED] Wound 08/12/19 #1, Left Lower Leg CLUSTER,

## 2020-01-20 ENCOUNTER — HOSPITAL ENCOUNTER (OUTPATIENT)
Dept: WOUND CARE | Age: 60
Discharge: HOME | End: 2020-01-20
Payer: COMMERCIAL

## 2020-01-20 VITALS
DIASTOLIC BLOOD PRESSURE: 58 MMHG | BODY MASS INDEX: 49.44 KG/M2 | RESPIRATION RATE: 18 BRPM | HEIGHT: 67 IN | HEART RATE: 91 BPM | SYSTOLIC BLOOD PRESSURE: 105 MMHG | TEMPERATURE: 98.3 F | WEIGHT: 315 LBS

## 2020-01-20 PROCEDURE — 29581 APPL MULTLAYER CMPRN SYS LEG: CPT

## 2020-01-20 PROCEDURE — 99212 OFFICE O/P EST SF 10 MIN: CPT

## 2020-01-20 RX ORDER — LIDOCAINE 40 MG/G
CREAM TOPICAL ONCE
Status: DISCONTINUED | OUTPATIENT
Start: 2020-01-20 | End: 2020-01-21 | Stop reason: HOSPADM

## 2020-01-20 NOTE — PLAN OF CARE
Pt to the [address] [place] [address],[address] for initial appointment. Pt with wounds on bilateral lower legs and right heel. Pt to have compression wraps applied to bilateral lower legs. Pt to follow up in the [address] [place] [address],[address] [place] [address] in 1 week. Discharge instructions reviewed with patient, all questions answered, copy given to patient. Dressings were applied to all wounds per M.D. Instructions at this visit.

## 2020-01-20 NOTE — PROGRESS NOTES
88 [facility] Progress Note      Bigg Farooq     : 1960    DATE OF VISIT:  2020    Subjective:     Bigg Farooq is a 60 y.o. male who has a chief complaint of a venous ulcer located on the bilateral leg. Developed increased swelling in his legs and wounds occurred. Mr. Federico Phillips has a past medical history of Cellulitis, Hypertension, and Osteomyelitis of left foot ([address] He has a past surgical history that includes Toe amputation (Left, 2014). His family history includes Diabetes in his brother; Heart Failure in his mother; Hypertension in his father; Stroke in his father. Mr. Federico Phillips reports that he has never smoked. He has never used smokeless tobacco. He reports that he does not drink alcohol or use drugs. His current medication list consists of Folinic Acid-Vit B6-Vit B12, levothyroxine, lisinopril-hydrochlorothiazide, metoprolol succinate, and potassium chloride. Allergies: Patient has no known allergies. Pertinent items from the review of systems are discussed in the HPI; the remainder of the ROS was reviewed and is negative.        Objective:     BP (!) 105/58   Pulse 91   Temp 98.3 °F (36.8 °C) (Oral)   Resp 18   Ht 5' 7\" (1.702 m)   Wt (!) 334 lb (151.5 kg)   BMI 52.31 kg/m²   General Appearance: alert and oriented to person, place and time, well developed and well- nourished, in no acute distress  Skin: warm and dry, no rash or erythema  Head: normocephalic and atraumatic  Eyes: pupils equal, round, and reactive to light, extraocular eye movements intact, conjunctivae normal  ENT: tympanic membrane, external ear and ear canal normal bilaterally, nose without deformity, nasal mucosa and turbinates normal without polyps  Neck: supple and non-tender without mass, no thyromegaly or thyroid nodules, no cervical lymphadenopathy  Pulmonary/Chest: clear to auscultation bilaterally- no wheezes, rales or rhonchi, normal air movement, no Thickness, Onset 1/1/20-Wound Depth (cm): 0.1 cm  Wound 01/20/20 #3, Right Heel, Pressure Ulcer, Stage 2, Onset 1/1/20-Wound Depth (cm): 0.1 cm  Wound 01/20/20 #4, Left Lower Leg Cluster, Venous Ulcer, Partial Thickness, Onset 1/1/20-Wound Depth (cm): 0.1 cm    LABS  No results found for: LABA1C      Assessment:     Patient Active Problem List   Diagnosis Code    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 Factors contributing to occurrence and/or persistence of the chronic ulcer include edema, venous stasis and obesity. Sharp debridement is not indicated today, based upon the exam findings in the ulcer(s) above. Discharge plan:     Treatment in the wound care center today: Wound 01/20/20 #2, Right Lower Leg Cluster, Venous, Partial Thickness, Onset 1/1/20-Dressing/Treatment: (polymen,interdry ag,optilock,compri 2, g spandagrip)  Wound 01/20/20 #3, Right Heel, Pressure Ulcer, Stage 2, Onset 1/1/20-Dressing/Treatment: (polymen,interdry ag,optilock,compri 2, g spandagrip)  Wound 01/20/20 #4, Left Lower Leg Cluster, Venous Ulcer, Partial Thickness, Onset 1/1/20-Dressing/Treatment: Other (comment)(polymem ag,interdry ag,optiloc,compri2,g spandagrip ). Home treatment: good handwashing before and after any dressing changes. Cleanse ulcer with saline or soap & water before dressing change. May use Vaseline (petrolatum), Aquaphor, Aveeno, CeraVe, Cetaphil, Eucerin, Lubriderm, etc for dry skin.      Dressing type for home: Per physician order,

## 2020-01-22 ENCOUNTER — HOSPITAL ENCOUNTER (OUTPATIENT)
Dept: WOUND CARE | Age: 60
Discharge: HOME | End: 2020-01-22
Payer: COMMERCIAL

## 2020-01-22 VITALS
RESPIRATION RATE: 18 BRPM | DIASTOLIC BLOOD PRESSURE: 62 MMHG | HEIGHT: 67 IN | TEMPERATURE: 97.5 F | HEART RATE: 96 BPM | WEIGHT: 315 LBS | BODY MASS INDEX: 49.44 KG/M2 | SYSTOLIC BLOOD PRESSURE: 107 MMHG

## 2020-01-22 PROCEDURE — 29581 APPL MULTLAYER CMPRN SYS LEG: CPT

## 2020-01-27 ENCOUNTER — HOSPITAL ENCOUNTER (OUTPATIENT)
Dept: WOUND CARE | Age: 60
Discharge: HOME | End: 2020-01-27
Payer: COMMERCIAL

## 2020-01-27 VITALS
WEIGHT: 315 LBS | HEIGHT: 67 IN | RESPIRATION RATE: 18 BRPM | HEART RATE: 92 BPM | TEMPERATURE: 97 F | DIASTOLIC BLOOD PRESSURE: 81 MMHG | SYSTOLIC BLOOD PRESSURE: 127 MMHG | BODY MASS INDEX: 49.44 KG/M2

## 2020-01-27 PROCEDURE — 29581 APPL MULTLAYER CMPRN SYS LEG: CPT

## 2020-01-27 RX ORDER — LIDOCAINE 40 MG/G
CREAM TOPICAL ONCE
Status: DISCONTINUED | OUTPATIENT
Start: 2020-01-27 | End: 2020-01-28 | Stop reason: HOSPADM

## 2020-01-27 NOTE — PROGRESS NOTES
[facility] Progress Note      Berny Blackburn     : 1960    DATE OF VISIT:  2020    Subjective:     Berny Blackburn is a 60 y.o. male who has a chief complaint of a venous ulcer located on the bilateral leg. Had nurse visit last week due to right leg drained through his wrap. States right heel was a little sore when walking. Mr. Zafar Redmond has a past medical history of Cellulitis, Hypertension, and Osteomyelitis of left foot ([address] He has a past surgical history that includes Toe amputation (Left, 2014). His family history includes Diabetes in his brother; Heart Failure in his mother; Hypertension in his father; Stroke in his father. Mr. Zafar Redmond reports that he has never smoked. He has never used smokeless tobacco. He reports that he does not drink alcohol or use drugs. His current medication list consists of Folinic Acid-Vit B6-Vit B12, levothyroxine, lisinopril-hydrochlorothiazide, metoprolol succinate, and potassium chloride. Allergies: Patient has no known allergies. Pertinent items from the review of systems are discussed in the HPI; the remainder of the ROS was reviewed and is negative.        Objective:     /81   Pulse 92   Temp 97 °F (36.1 °C) (Oral)   Resp 18   Ht 5' 7\" (1.702 m)   Wt (!) 337 lb (152.9 kg)   BMI 52.78 kg/m²   General Appearance: alert and oriented to person, place and time, well developed and well- nourished, in no acute distress  Skin: warm and dry, no rash or erythema  Head: normocephalic and atraumatic  Eyes: pupils equal, round, and reactive to light, extraocular eye movements intact, conjunctivae normal  ENT: tympanic membrane, external ear and ear canal normal bilaterally, nose without deformity, nasal mucosa and turbinates normal without polyps  Neck: supple and non-tender without mass, no thyromegaly or thyroid nodules, no cervical lymphadenopathy  Pulmonary/Chest: clear to auscultation bilaterally- no 1 week.              Electronically signed by Reggie Sesay DPM on 1/27/2020 at 11:14 AM.

## 2020-01-27 NOTE — PLAN OF CARE
Pt to the [facility] for follow up appointment. Wounds improving. Pt to have compression wraps applied to bilateral lower legs. Pt to have wound reassessment on 1/30/2020 and follow up in the [facility] with Dr Cheryle Dark in 1 week. Discharge instructions reviewed with patient, all questions answered, copy given to patient. Dressings were applied to all wounds per M.D. Instructions at this visit.

## 2020-02-03 ENCOUNTER — HOSPITAL ENCOUNTER (OUTPATIENT)
Dept: WOUND CARE | Age: 60
Discharge: HOME | End: 2020-02-03
Payer: COMMERCIAL

## 2020-02-03 VITALS
SYSTOLIC BLOOD PRESSURE: 123 MMHG | DIASTOLIC BLOOD PRESSURE: 60 MMHG | TEMPERATURE: 98.5 F | RESPIRATION RATE: 86 BRPM | WEIGHT: 315 LBS | HEART RATE: 86 BPM | HEIGHT: 67 IN | BODY MASS INDEX: 49.44 KG/M2

## 2020-02-03 PROCEDURE — 29581 APPL MULTLAYER CMPRN SYS LEG: CPT

## 2020-02-03 RX ORDER — LIDOCAINE 40 MG/G
CREAM TOPICAL ONCE
Status: DISCONTINUED | OUTPATIENT
Start: 2020-02-03 | End: 2020-02-04 | Stop reason: HOSPADM

## 2020-02-03 RX ORDER — AMOXICILLIN AND CLAVULANATE POTASSIUM 875; 125 MG/1; MG/1
1 TABLET, FILM COATED ORAL 2 TIMES DAILY
Qty: 28 TABLET | Refills: 0 | Status: SHIPPED | OUTPATIENT
Start: 2020-02-03 | End: 2020-02-17

## 2020-02-03 ASSESSMENT — PAIN SCALES - GENERAL: PAINLEVEL_OUTOF10: 0

## 2020-02-03 NOTE — PROGRESS NOTES
88 [facility] Progress Note      Roge Deleon     : 1960    DATE OF VISIT:  2/3/2020    Subjective:     Roge Deleon is a 60 y.o. male who has a chief complaint of a venous ulcer located on the bilateral leg. Less drainage this week. States right heel is still sore when walking. Mr. Mary Anderson has a past medical history of Cellulitis, Hypertension, and Osteomyelitis of left foot ([address] He has a past surgical history that includes Toe amputation (Left, 2014). His family history includes Diabetes in his brother; Heart Failure in his mother; Hypertension in his father; Stroke in his father. Mr. Mary Anderson reports that he has never smoked. He has never used smokeless tobacco. He reports that he does not drink alcohol or use drugs. His current medication list consists of Folinic Acid-Vit B6-Vit B12, amoxicillin-clavulanate, levothyroxine, lisinopril-hydrochlorothiazide, metoprolol succinate, and potassium chloride. Allergies: Patient has no known allergies. Pertinent items from the review of systems are discussed in the HPI; the remainder of the ROS was reviewed and is negative.        Objective:     /60   Pulse 86   Temp 98.5 °F (36.9 °C) (Oral)   Resp (!) 86   Ht 5' 7\" (1.702 m)   Wt (!) 331 lb 3.2 oz (150.2 kg)   BMI 51.87 kg/m²   General Appearance: alert and oriented to person, place and time, well developed and well- nourished, in no acute distress  Skin: warm and dry, no rash or erythema  Head: normocephalic and atraumatic  Eyes: pupils equal, round, and reactive to light, extraocular eye movements intact, conjunctivae normal  ENT: tympanic membrane, external ear and ear canal normal bilaterally, nose without deformity, nasal mucosa and turbinates normal without polyps  Neck: supple and non-tender without mass, no thyromegaly or thyroid nodules, no cervical lymphadenopathy  Pulmonary/Chest: clear to auscultation bilaterally- no wheezes, rales or Stage 2, Onset 1/1/20-Wound Depth (cm): 0.5 cm  Wound 01/20/20 #2, Right Lower Leg Cluster, Venous, Partial Thickness, Onset 1/1/20-Wound Depth (cm): 0.1 cm  Wound 01/20/20 #4, Left Lower Leg Cluster, Venous Ulcer, Partial Thickness, Onset 1/1/20-Wound Depth (cm): 0.1 cm    LABS  No results found for: LABA1C      Assessment:     Patient Active Problem List   Diagnosis Code    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 Factors contributing to occurrence and/or persistence of the chronic ulcer include edema, venous stasis and obesity. Sharp debridement is not indicated today, based upon the exam findings in the ulcer(s) above. Discharge plan:     Treatment in the wound care center today: Wound 01/20/20 #3, Right Heel, Pressure Ulcer, Stage 2, Onset 1/1/20-Dressing/Treatment: Other (comment)(flagyl, optifoamAg,kerramaxinterdry, ABD, Optilock, compri 2)  Wound 01/20/20 #2, Right Lower Leg Cluster, Venous, Partial Thickness, Onset 1/1/20-Dressing/Treatment: Other (comment)(flagyl, optifoamAg,interdry, ABD, Optilock, compri 2)  Wound 01/20/20 #4, Left Lower Leg Cluster, Venous Ulcer, Partial Thickness, Onset 1/1/20-Dressing/Treatment: Other (comment)(flagyl, optifoamAg,interdry, ABD, Optilock, compri 2). Home treatment: good handwashing before and after any dressing changes. Cleanse ulcer with saline or soap & water before dressing change. May use Vaseline (petrolatum), Aquaphor, Aveeno, CeraVe, Cetaphil, Eucerin, Lubriderm, etc for dry skin.

## 2020-02-10 ENCOUNTER — HOSPITAL ENCOUNTER (OUTPATIENT)
Dept: WOUND CARE | Age: 60
Discharge: HOME | End: 2020-02-10
Payer: COMMERCIAL

## 2020-02-10 VITALS
WEIGHT: 315 LBS | RESPIRATION RATE: 20 BRPM | DIASTOLIC BLOOD PRESSURE: 53 MMHG | TEMPERATURE: 97.4 F | HEIGHT: 67 IN | HEART RATE: 83 BPM | BODY MASS INDEX: 49.44 KG/M2 | SYSTOLIC BLOOD PRESSURE: 113 MMHG

## 2020-02-10 PROCEDURE — 29581 APPL MULTLAYER CMPRN SYS LEG: CPT

## 2020-02-10 RX ORDER — LIDOCAINE 40 MG/G
CREAM TOPICAL
Status: DISPENSED | OUTPATIENT
Start: 2020-02-10 | End: 2020-02-10

## 2020-02-10 ASSESSMENT — PAIN SCALES - GENERAL: PAINLEVEL_OUTOF10: 0

## 2020-02-10 NOTE — PLAN OF CARE
Pt to the [facility] for follow up appointment. Wounds improving. Pt to continue with weekly compression wraps. Pt to follow up in the [facility] in 1 week. Discharge instructions reviewed with patient, all questions answered, copy given to patient. Dressings were applied to all wounds per M.D. Instructions at this visit.

## 2020-02-10 NOTE — PROGRESS NOTES
[facility] Progress Note      Nestor Truong     : 1960    DATE OF VISIT:  2/10/2020    Subjective:     Nestor Truong is a 60 y.o. male who has a chief complaint of a venous ulcer located on the bilateral leg. States right heel feels much better this week. Mr. Megan Dumont has a past medical history of Cellulitis, Hypertension, and Osteomyelitis of left foot ([address] He has a past surgical history that includes Toe amputation (Left, 2014). His family history includes Diabetes in his brother; Heart Failure in his mother; Hypertension in his father; Stroke in his father. Mr. Megan Dumont reports that he has never smoked. He has never used smokeless tobacco. He reports that he does not drink alcohol or use drugs. His current medication list consists of Folinic Acid-Vit B6-Vit B12, amoxicillin-clavulanate, levothyroxine, lisinopril-hydrochlorothiazide, metoprolol succinate, and potassium chloride. Allergies: Patient has no known allergies. Pertinent items from the review of systems are discussed in the HPI; the remainder of the ROS was reviewed and is negative.        Objective:     BP (!) 113/53   Pulse 83   Temp 97.4 °F (36.3 °C) (Oral)   Resp 20   Ht 5' 7\" (1.702 m)   Wt (!) 338 lb (153.3 kg)   BMI 52.94 kg/m²   General Appearance: alert and oriented to person, place and time, well developed and well- nourished, in no acute distress  Skin: warm and dry, no rash or erythema  Head: normocephalic and atraumatic  Eyes: pupils equal, round, and reactive to light, extraocular eye movements intact, conjunctivae normal  ENT: tympanic membrane, external ear and ear canal normal bilaterally, nose without deformity, nasal mucosa and turbinates normal without polyps  Neck: supple and non-tender without mass, no thyromegaly or thyroid nodules, no cervical lymphadenopathy  Pulmonary/Chest: clear to auscultation bilaterally- no wheezes, rales or rhonchi, normal air movement, no respiratory distress  Cardiovascular: normal rate, regular rhythm, normal S1 and S2, no murmurs, rubs, clicks, or gallops, distal pulses intact, no carotid bruits  Abdomen: soft, non-tender, non-distended, normal bowel sounds, no masses or organomegaly. Dorsalis pedis pulse left palpable  Posterior tibial pulse left palpable  Dorsalis pedis pulse right palpable  Posterior tibial pulse right palpable      Ulcer on the bilateral LE with mild fibrotic tissue, red granulation tissue, mild serous drainage, no hyperkeratotic rim, no undermining, no tunneling, no purulence, no malodor, no eschar, no periwound maceration, mild periwound erythema, mild edema, no crepitus, no increase in skin temperature, ulcers probe to soft tissue only    Ulcer on the plantar aspect left heel with minimal fibrotic tissue, red granulation tissue, mild serous drainage, no hyperkeratotic rim, no undermining, no tunneling, small amount of purulence, no malodor, no eschar, no periwound maceration, minimal periwound erythema, mild edema, no crepitus, no increase in skin temperature, ulcer probes to soft tissue only     Edema bilateral LE. Today's ulcer measurements are in the wound documentation flowsheet.      Wound measurements:  Wound 01/20/20 #2, Right Lower Leg Cluster, Venous, Partial Thickness, Onset 1/1/20-Wound Length (cm): 1.2 cm  Wound 01/20/20 #3, Right Heel, Pressure Ulcer, Stage 2, Onset 1/1/20-Wound Length (cm): 0.7 cm  Wound 01/20/20 #4, Left Lower Leg Cluster, Venous Ulcer, Partial Thickness, Onset 1/1/20-Wound Length (cm): 2.1 cm    Wound 01/20/20 #2, Right Lower Leg Cluster, Venous, Partial Thickness, Onset 1/1/20-Wound Width (cm): 1.1 cm  Wound 01/20/20 #3, Right Heel, Pressure Ulcer, Stage 2, Onset 1/1/20-Wound Width (cm): 1.3 cm  Wound 01/20/20 #4, Left Lower Leg Cluster, Venous Ulcer, Partial Thickness, Onset 1/1/20-Wound Width (cm): 4.1 cm    Wound 01/20/20 #2, Right Lower Leg Cluster, Venous, Partial Thickness, Onset 1/1/20-Wound Depth (cm): 0.2 cm  Wound 01/20/20 #3, Right Heel, Pressure Ulcer, Stage 2, Onset 1/1/20-Wound Depth (cm): 0.3 cm  Wound 01/20/20 #4, Left Lower Leg Cluster, Venous Ulcer, Partial Thickness, Onset 1/1/20-Wound Depth (cm): 0.1 cm    LABS  No results found for: LABA1C      Assessment:     Patient Active Problem List   Diagnosis Code    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 Factors contributing to occurrence and/or persistence of the chronic ulcer include edema, venous stasis and obesity. Sharp debridement is not indicated today, based upon the exam findings in the ulcer(s) above. Discharge plan:     Treatment in the wound care center today:  . Home treatment: good handwashing before and after any dressing changes. Cleanse ulcer with saline or soap & water before dressing change. May use Vaseline (petrolatum), Aquaphor, Aveeno, CeraVe, Cetaphil, Eucerin, Lubriderm, etc for dry skin. Dressing type for home: Per physician order, bilateral application of multilayer compression wrap was performed in the wound care center today, to help manage edema, stasis dermatitis, and/or venous ulcers. Leave primary dressing and multi-layer wrap(s) in place until the next appointment. Interdry beneath compression wraps to decrease maceration. Flagyl to heel ulcer. Extra padding to heel. Written discharge instructions given to patient. Offload ulcer(s) as directed. Elevate leg(s) as directed. Follow up in 1 week.

## 2020-02-17 ENCOUNTER — HOSPITAL ENCOUNTER (OUTPATIENT)
Dept: WOUND CARE | Age: 60
Discharge: HOME | End: 2020-02-17
Payer: COMMERCIAL

## 2020-02-17 VITALS
WEIGHT: 315 LBS | BODY MASS INDEX: 49.44 KG/M2 | DIASTOLIC BLOOD PRESSURE: 65 MMHG | TEMPERATURE: 97.2 F | RESPIRATION RATE: 20 BRPM | SYSTOLIC BLOOD PRESSURE: 126 MMHG | HEART RATE: 76 BPM | HEIGHT: 67 IN

## 2020-02-17 PROCEDURE — 29584 APPL MLTLAY CMPRN SYS UP ARM: CPT

## 2020-02-17 PROCEDURE — 29581 APPL MULTLAYER CMPRN SYS LEG: CPT

## 2020-02-17 RX ORDER — LIDOCAINE 40 MG/G
CREAM TOPICAL ONCE
Status: DISCONTINUED | OUTPATIENT
Start: 2020-02-17 | End: 2020-02-18 | Stop reason: HOSPADM

## 2020-02-17 ASSESSMENT — PAIN SCALES - GENERAL
PAINLEVEL_OUTOF10: 0
PAINLEVEL_OUTOF10: 0

## 2020-02-17 NOTE — PROGRESS NOTES
respiratory distress  Cardiovascular: normal rate, regular rhythm, normal S1 and S2, no murmurs, rubs, clicks, or gallops, distal pulses intact, no carotid bruits  Abdomen: soft, non-tender, non-distended, normal bowel sounds, no masses or organomegaly. Dorsalis pedis pulse left palpable  Posterior tibial pulse left palpable  Dorsalis pedis pulse right palpable  Posterior tibial pulse right palpable      Ulcer on the bilateral LE with mild fibrotic tissue, red granulation tissue, mild serous drainage, no hyperkeratotic rim, no undermining, no tunneling, no purulence, no malodor, no eschar, no periwound maceration, mild periwound erythema, mild edema, no crepitus, no increase in skin temperature, ulcers probe to soft tissue only    Ulcer on the plantar aspect left heel with minimal fibrotic tissue, red granulation tissue, mild serous drainage, no hyperkeratotic rim, no undermining, no tunneling, small amount of purulence, no malodor, no eschar, no periwound maceration, minimal periwound erythema, mild edema, no crepitus, no increase in skin temperature, ulcer probes to soft tissue only     Edema bilateral LE. Today's ulcer measurements are in the wound documentation flowsheet.      Wound measurements:  Wound 01/20/20 #2, Right Lower Leg Cluster, Venous, Partial Thickness, Onset 1/1/20-Wound Length (cm): 0.6 cm  Wound 01/20/20 #3, Right Heel, Pressure Ulcer, Stage 2, Onset 1/1/20-Wound Length (cm): 0.7 cm  Wound 01/20/20 #4, Left Lower Leg Cluster, Venous Ulcer, Partial Thickness, Onset 1/1/20-Wound Length (cm): 0.8 cm    Wound 01/20/20 #2, Right Lower Leg Cluster, Venous, Partial Thickness, Onset 1/1/20-Wound Width (cm): 0.8 cm  Wound 01/20/20 #3, Right Heel, Pressure Ulcer, Stage 2, Onset 1/1/20-Wound Width (cm): 0.9 cm  Wound 01/20/20 #4, Left Lower Leg Cluster, Venous Ulcer, Partial Thickness, Onset 1/1/20-Wound Width (cm): 0.7 cm    Wound 01/20/20 #2, Right Lower Leg Cluster, Venous, Partial Thickness, Onset 1/1/20-Wound Depth (cm): 0.1 cm  Wound 01/20/20 #3, Right Heel, Pressure Ulcer, Stage 2, Onset 1/1/20-Wound Depth (cm): 0.3 cm  Wound 01/20/20 #4, Left Lower Leg Cluster, Venous Ulcer, Partial Thickness, Onset 1/1/20-Wound Depth (cm): 0.1 cm    LABS  No results found for: LABA1C      Assessment:     Patient Active Problem List   Diagnosis Code    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 Factors contributing to occurrence and/or persistence of the chronic ulcer include edema, venous stasis and obesity. Sharp debridement is not indicated today, based upon the exam findings in the ulcer(s) above. Discharge plan:     Treatment in the wound care center today: Wound 01/20/20 #2, Right Lower Leg Cluster, Venous, Partial Thickness, Onset 1/1/20-Dressing/Treatment: Other (comment)(flagyl,polymem ag,interdry ag,kerra max,compri2,g spanda)  Wound 01/20/20 #3, Right Heel, Pressure Ulcer, Stage 2, Onset 1/1/20-Dressing/Treatment: Other (comment)(flagyl,polymem ag,interdry ag,abd,compri2,g spanda)  Wound 01/20/20 #4, Left Lower Leg Cluster, Venous Ulcer, Partial Thickness, Onset 1/1/20-Dressing/Treatment: Other (comment)(flagyl,polymem ag,interdry ag,optilock,compri2,g spanda). Home treatment: good handwashing before and after any dressing changes. Cleanse ulcer with saline or soap & water before dressing change. May use Vaseline (petrolatum), Aquaphor, Aveeno, CeraVe, Cetaphil, Eucerin, Lubriderm, etc for dry skin.      Dressing type for home: Per physician order, bilateral application of multilayer compression wrap was performed in the wound care center today, to help manage edema, stasis dermatitis, and/or venous ulcers. Leave primary dressing and multi-layer wrap(s) in place until the next appointment. Interdry beneath compression wraps to decrease maceration. Flagyl to heel ulcer. Extra padding to heel. Written discharge instructions given to patient. Offload ulcer(s) as directed. Elevate leg(s) as directed. Follow up in 1 week. Patient will benefit from compression stocking in future to help control his LE edema and help prevent future ulcerations.        Electronically signed by Lisa Mac DPM on 2/17/2020 at 4:26 PM.

## 2020-02-17 NOTE — PLAN OF CARE
Pt to the [facility] for follow up appointment. Wounds measuring smaller. Pt to continue with weekly compression wraps. Will check with [facility] about compression garments for patient. Pt to follow up in the [facility] in 1 week. Discharge instructions reviewed with patient, all questions answered, copy given to patient. Dressings were applied to all wounds per M.D. Instructions at this visit.

## 2020-02-24 ENCOUNTER — HOSPITAL ENCOUNTER (OUTPATIENT)
Dept: WOUND CARE | Age: 60
Discharge: HOME | End: 2020-02-24
Payer: COMMERCIAL

## 2020-02-24 VITALS
TEMPERATURE: 97.1 F | DIASTOLIC BLOOD PRESSURE: 65 MMHG | HEIGHT: 67 IN | WEIGHT: 315 LBS | RESPIRATION RATE: 20 BRPM | SYSTOLIC BLOOD PRESSURE: 106 MMHG | HEART RATE: 84 BPM | BODY MASS INDEX: 49.44 KG/M2

## 2020-02-24 PROCEDURE — 29581 APPL MULTLAYER CMPRN SYS LEG: CPT

## 2020-02-24 RX ORDER — LIDOCAINE 40 MG/G
CREAM TOPICAL ONCE
Status: DISCONTINUED | OUTPATIENT
Start: 2020-02-24 | End: 2020-02-25 | Stop reason: HOSPADM

## 2020-02-24 ASSESSMENT — PAIN SCALES - GENERAL
PAINLEVEL_OUTOF10: 0
PAINLEVEL_OUTOF10: 0

## 2020-02-24 NOTE — PLAN OF CARE
Pt to the [facility] for follow up appointment. Wounds improving. Pt to continue to with weekly compression wraps. Will have patient get compression garments. Awaiting for swelling to go down in right lower leg to order compression garments. Pt to follow up in the [facility] in 1 week. Discharge instructions reviewed with patient, all questions answered, copy given to patient. Dressings were applied to all wounds per M.D. Instructions at this visit.

## 2020-02-24 NOTE — PROGRESS NOTES
88 [facility] Progress Note      Armando Trevino     : 1960    DATE OF VISIT:  2020    Subjective:     Armando Trevino is a 63 y.o. male who has a chief complaint of a venous ulcer located on the bilateral leg. States right heel feels better this week. No issues with leg wraps. Mr. Lee Saucedo has a past medical history of Cellulitis, Hypertension, and Osteomyelitis of left foot ([address] He has a past surgical history that includes Toe amputation (Left, 2014). His family history includes Diabetes in his brother; Heart Failure in his mother; Hypertension in his father; Stroke in his father. Mr. Lee Saucedo reports that he has never smoked. He has never used smokeless tobacco. He reports that he does not drink alcohol or use drugs. His current medication list consists of Folinic Acid-Vit B6-Vit B12, Potassium Chloride, levothyroxine, lisinopril-hydroCHLOROthiazide, and metoprolol succinate. Allergies: Patient has no known allergies. Pertinent items from the review of systems are discussed in the HPI; the remainder of the ROS was reviewed and is negative.        Objective:     /65   Pulse 84   Temp 97.1 °F (36.2 °C) (Oral)   Resp 20   Ht 5' 7\" (1.702 m)   Wt (!) 344 lb 12.8 oz (156.4 kg)   BMI 54.00 kg/m²   General Appearance: alert and oriented to person, place and time, well developed and well- nourished, in no acute distress  Skin: warm and dry, no rash or erythema  Head: normocephalic and atraumatic  Eyes: pupils equal, round, and reactive to light, extraocular eye movements intact, conjunctivae normal  ENT: tympanic membrane, external ear and ear canal normal bilaterally, nose without deformity, nasal mucosa and turbinates normal without polyps  Neck: supple and non-tender without mass, no thyromegaly or thyroid nodules, no cervical lymphadenopathy  Pulmonary/Chest: clear to auscultation bilaterally- no wheezes, rales or rhonchi, normal air movement, no respiratory distress  Cardiovascular: normal rate, regular rhythm, normal S1 and S2, no murmurs, rubs, clicks, or gallops, distal pulses intact, no carotid bruits  Abdomen: soft, non-tender, non-distended, normal bowel sounds, no masses or organomegaly. Dorsalis pedis pulse left palpable  Posterior tibial pulse left palpable  Dorsalis pedis pulse right palpable  Posterior tibial pulse right palpable      Ulcer on the bilateral LE with mild fibrotic tissue, red granulation tissue, mild serous drainage, no hyperkeratotic rim, no undermining, no tunneling, no purulence, no malodor, no eschar, no periwound maceration, mild periwound erythema, mild edema, no crepitus, no increase in skin temperature, ulcers probe to soft tissue only    Ulcer on the plantar aspect left heel with minimal fibrotic tissue, red granulation tissue, mild serous drainage, no hyperkeratotic rim, no undermining, no tunneling, small amount of purulence, no malodor, no eschar, no periwound maceration, minimal periwound erythema, mild edema, no crepitus, no increase in skin temperature, ulcer probes to soft tissue only     Edema bilateral LE. Today's ulcer measurements are in the wound documentation flowsheet.      Wound measurements:  Wound 01/20/20 #2, Right Lower Leg Cluster, Venous, Partial Thickness, Onset 1/1/20-Wound Length (cm): 1.1 cm  Wound 01/20/20 #3, Right Heel, Pressure Ulcer, Stage 2, Onset 1/1/20-Wound Length (cm): 0.4 cm  Wound 01/20/20 #4, Left Lower Leg Cluster, Venous Ulcer, Partial Thickness, Onset 1/1/20-Wound Length (cm): 1.4 cm    Wound 01/20/20 #2, Right Lower Leg Cluster, Venous, Partial Thickness, Onset 1/1/20-Wound Width (cm): 0.8 cm  Wound 01/20/20 #3, Right Heel, Pressure Ulcer, Stage 2, Onset 1/1/20-Wound Width (cm): 0.6 cm  Wound 01/20/20 #4, Left Lower Leg Cluster, Venous Ulcer, Partial Thickness, Onset 1/1/20-Wound Width (cm): 1.6 cm    Wound 01/20/20 #2, Right Lower Leg Cluster, Venous, Partial Thickness, Onset 1/1/20-Wound Depth (cm): 0.1 cm  Wound 01/20/20 #3, Right Heel, Pressure Ulcer, Stage 2, Onset 1/1/20-Wound Depth (cm): 0.2 cm  Wound 01/20/20 #4, Left Lower Leg Cluster, Venous Ulcer, Partial Thickness, Onset 1/1/20-Wound Depth (cm): 0.1 cm    LABS  No results found for: LABA1C      Assessment:     Patient Active Problem List   Diagnosis Code    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 Factors contributing to occurrence and/or persistence of the chronic ulcer include edema, venous stasis and obesity. Sharp debridement is not indicated today, based upon the exam findings in the ulcer(s) above. Discharge plan:     Treatment in the wound care center today: Wound 01/20/20 #2, Right Lower Leg Cluster, Venous, Partial Thickness, Onset 1/1/20-Dressing/Treatment: Other (comment)(flagyl, polymem Ag, interdry, kerramax,compri 2)  Wound 01/20/20 #3, Right Heel, Pressure Ulcer, Stage 2, Onset 1/1/20-Dressing/Treatment: Other (comment)(flagyl, polymem Ag, ABD,compri 2)  Wound 01/20/20 #4, Left Lower Leg Cluster, Venous Ulcer, Partial Thickness, Onset 1/1/20-Dressing/Treatment: Other (comment)(flagyl, polymem Ag, interdry, Optilock,compri 2). Home treatment: good handwashing before and after any dressing changes. Cleanse ulcer with saline or soap & water before dressing change. May use Vaseline (petrolatum), Aquaphor, Aveeno, CeraVe, Cetaphil, Eucerin, Lubriderm, etc for dry skin.      Dressing type for home: Per physician order, bilateral

## 2020-03-02 ENCOUNTER — HOSPITAL ENCOUNTER (OUTPATIENT)
Dept: WOUND CARE | Age: 60
Discharge: HOME | End: 2020-03-02
Payer: COMMERCIAL

## 2020-03-02 VITALS
SYSTOLIC BLOOD PRESSURE: 121 MMHG | HEART RATE: 90 BPM | BODY MASS INDEX: 49.44 KG/M2 | HEIGHT: 67 IN | RESPIRATION RATE: 18 BRPM | DIASTOLIC BLOOD PRESSURE: 70 MMHG | TEMPERATURE: 98.1 F | WEIGHT: 315 LBS

## 2020-03-02 PROCEDURE — 29581 APPL MULTLAYER CMPRN SYS LEG: CPT

## 2020-03-02 RX ORDER — LIDOCAINE 40 MG/G
CREAM TOPICAL ONCE
Status: DISCONTINUED | OUTPATIENT
Start: 2020-03-02 | End: 2020-03-03 | Stop reason: HOSPADM

## 2020-03-02 ASSESSMENT — PAIN SCALES - GENERAL: PAINLEVEL_OUTOF10: 0

## 2020-03-02 NOTE — PLAN OF CARE
Pt to the [facility] for follow up appointment. Wounds improving. Pt to continue with weekly compression wraps. Will send information to [facility] for new compression garments for patient. Pt to follow up in the [facility] in 1 week. Discharge instructions reviewed with patient, all questions answered, copy given to patient. Dressings were applied to all wounds per M.D. Instructions at this visit.

## 2020-03-09 ENCOUNTER — HOSPITAL ENCOUNTER (OUTPATIENT)
Dept: WOUND CARE | Age: 60
Discharge: HOME | End: 2020-03-09
Payer: COMMERCIAL

## 2020-03-09 VITALS
SYSTOLIC BLOOD PRESSURE: 100 MMHG | BODY MASS INDEX: 49.44 KG/M2 | RESPIRATION RATE: 20 BRPM | HEART RATE: 75 BPM | HEIGHT: 67 IN | DIASTOLIC BLOOD PRESSURE: 62 MMHG | WEIGHT: 315 LBS | TEMPERATURE: 96.9 F

## 2020-03-09 PROCEDURE — 29581 APPL MULTLAYER CMPRN SYS LEG: CPT

## 2020-03-09 RX ORDER — LIDOCAINE 40 MG/G
CREAM TOPICAL ONCE
Status: DISCONTINUED | OUTPATIENT
Start: 2020-03-09 | End: 2020-03-10 | Stop reason: HOSPADM

## 2020-03-09 ASSESSMENT — PAIN SCALES - GENERAL
PAINLEVEL_OUTOF10: 0
PAINLEVEL_OUTOF10: 0

## 2020-03-09 NOTE — PROGRESS NOTES
rhonchi, normal air movement, no respiratory distress  Cardiovascular: normal rate, regular rhythm, normal S1 and S2, no murmurs, rubs, clicks, or gallops, distal pulses intact, no carotid bruits  Abdomen: soft, non-tender, non-distended, normal bowel sounds, no masses or organomegaly. Dorsalis pedis pulse left palpable  Posterior tibial pulse left palpable  Dorsalis pedis pulse right palpable  Posterior tibial pulse right palpable      Ulcer on the bilateral LE with mild fibrotic tissue, red granulation tissue, mild to moderateserous drainage, no hyperkeratotic rim, no undermining, no tunneling, no purulence, no malodor, no eschar, no periwound maceration, mild periwound erythema, mild edema, no crepitus, no increase in skin temperature, ulcers probe to soft tissue only    Ulcer on the plantar aspect left heel with thin epithelial coverage. Lysing periwound skin noted. Edema bilateral LE in proximal aspect of lower legs. Today's ulcer measurements are in the wound documentation flowsheet.      Wound measurements:  Wound 01/20/20 #2, Right Lower Leg Cluster, Venous, Partial Thickness, Onset 1/1/20-Wound Length (cm): 0.3 cm  [REMOVED] Wound 01/20/20 #3, Right Heel, Pressure Ulcer, Stage 2, Onset 1/1/20-Wound Length (cm): 0 cm  Wound 01/20/20 #4, Left Lower Leg Cluster, Venous Ulcer, Partial Thickness, Onset 1/1/20-Wound Length (cm): 16 cm    Wound 01/20/20 #2, Right Lower Leg Cluster, Venous, Partial Thickness, Onset 1/1/20-Wound Width (cm): 0.3 cm  [REMOVED] Wound 01/20/20 #3, Right Heel, Pressure Ulcer, Stage 2, Onset 1/1/20-Wound Width (cm): 0 cm  Wound 01/20/20 #4, Left Lower Leg Cluster, Venous Ulcer, Partial Thickness, Onset 1/1/20-Wound Width (cm): 8 cm    Wound 01/20/20 #2, Right Lower Leg Cluster, Venous, Partial Thickness, Onset 1/1/20-Wound Depth (cm): 0.1 cm  [REMOVED] Wound 01/20/20 #3, Right Heel, Pressure Ulcer, Stage 2, Onset 1/1/20-Wound Depth (cm): 0 cm  Wound 01/20/20 #4, Left Lower Leg Cluster, Venous Ulcer, Partial Thickness, Onset 1/1/20-Wound Depth (cm): 0.1 cm    LABS  No results found for: LABA1C      Assessment:     Patient Active Problem List   Diagnosis Code    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 Factors contributing to occurrence and/or persistence of the chronic ulcer include edema, venous stasis and obesity. Sharp debridement is not indicated today, based upon the exam findings in the ulcer(s) above. Discharge plan:     Treatment in the wound care center today:  . Home treatment: good handwashing before and after any dressing changes. Cleanse ulcer with saline or soap & water before dressing change. May use Vaseline (petrolatum), Aquaphor, Aveeno, CeraVe, Cetaphil, Eucerin, Lubriderm, etc for dry skin. Dressing type for home: Per physician order, bilateral application of multilayer compression wrap was performed in the wound care center today, to help manage edema, stasis dermatitis, and/or venous ulcers. Leave primary dressing and multi-layer wrap(s) in place until the next appointment. Extra padding to heel. Written discharge instructions given to patient. Offload ulcer(s) as directed. Elevate leg(s) as directed. Follow up in 1 week. Compression wraps slid down on the weekend. Patient knows to contact the clinic if has issues with compression wraps during the week. He can remove compression wraps on weekend if they slid down again.        Electronically signed by Alexsandra Gr DPM on 3/9/2020 at 10:13 AM.

## 2020-03-09 NOTE — PROGRESS NOTES
88 [facility] Progress Note      Kenny Louis     : 1960    DATE OF VISIT:  3/2/2020    Subjective:     Kenny Louis is a 63 y.o. male who has a chief complaint of a venous ulcer located on the bilateral leg. States right heel feels a little better this week. No issues with leg wraps. Mr. Manohar Alford has a past medical history of Cellulitis, Hypertension, and Osteomyelitis of left foot ([address] He has a past surgical history that includes Toe amputation (Left, 2014). His family history includes Diabetes in his brother; Heart Failure in his mother; Hypertension in his father; Stroke in his father. Mr. Manohar Alford reports that he has never smoked. He has never used smokeless tobacco. He reports that he does not drink alcohol or use drugs. His current medication list consists of Folinic Acid-Vit B6-Vit B12, Potassium Chloride, levothyroxine, lisinopril-hydroCHLOROthiazide, and metoprolol succinate. Allergies: Patient has no known allergies. Pertinent items from the review of systems are discussed in the HPI; the remainder of the ROS was reviewed and is negative.        Objective:     /70   Pulse 90   Temp 98.1 °F (36.7 °C) (Oral)   Resp 18   Ht 5' 7\" (1.702 m)   Wt (!) 343 lb 9.6 oz (155.9 kg)   BMI 53.82 kg/m²   General Appearance: alert and oriented to person, place and time, well developed and well- nourished, in no acute distress  Skin: warm and dry, no rash or erythema  Head: normocephalic and atraumatic  Eyes: pupils equal, round, and reactive to light, extraocular eye movements intact, conjunctivae normal  ENT: tympanic membrane, external ear and ear canal normal bilaterally, nose without deformity, nasal mucosa and turbinates normal without polyps  Neck: supple and non-tender without mass, no thyromegaly or thyroid nodules, no cervical lymphadenopathy  Pulmonary/Chest: clear to auscultation bilaterally- no wheezes, rales or rhonchi, normal air skin.     Dressing type for home: Per physician order, bilateral application of multilayer compression wrap was performed in the wound care center today, to help manage edema, stasis dermatitis, and/or venous ulcers. Leave primary dressing and multi-layer wrap(s) in place until the next appointment. Flagyl to heel ulcer. Extra padding to heel. Written discharge instructions given to patient. Offload ulcer(s) as directed. Elevate leg(s) as directed. Follow up in 1 week. Patient will benefit from compression stockings in future to help control his LE edema and help prevent future ulcerations.         Electronically signed by Linda Roland DPM on 3/9/2020 at 4:21 PM.

## 2020-03-16 ENCOUNTER — HOSPITAL ENCOUNTER (OUTPATIENT)
Dept: WOUND CARE | Age: 60
Discharge: HOME | End: 2020-03-16
Payer: COMMERCIAL

## 2020-03-16 VITALS
RESPIRATION RATE: 20 BRPM | SYSTOLIC BLOOD PRESSURE: 122 MMHG | TEMPERATURE: 96.9 F | HEART RATE: 71 BPM | HEIGHT: 67 IN | BODY MASS INDEX: 49.44 KG/M2 | WEIGHT: 315 LBS | DIASTOLIC BLOOD PRESSURE: 66 MMHG

## 2020-03-16 PROCEDURE — 29581 APPL MULTLAYER CMPRN SYS LEG: CPT

## 2020-03-16 RX ORDER — LIDOCAINE 40 MG/G
CREAM TOPICAL ONCE
Status: DISCONTINUED | OUTPATIENT
Start: 2020-03-16 | End: 2020-03-17 | Stop reason: HOSPADM

## 2020-03-16 ASSESSMENT — PAIN SCALES - GENERAL
PAINLEVEL_OUTOF10: 0
PAINLEVEL_OUTOF10: 0

## 2020-03-16 NOTE — PROGRESS NOTES
88 [facility] Progress Note      Chema Rothman     : 1960    DATE OF VISIT:  3/16/2020    Subjective:     Chema Rothman is a 63 y.o. male who has a chief complaint of a venous ulcer located on the bilateral leg. States right heel feels much good this week. Mr. Abebe Venegas has a past medical history of Cellulitis, Hypertension, and Osteomyelitis of left foot ([address] He has a past surgical history that includes Toe amputation (Left, 2014). His family history includes Diabetes in his brother; Heart Failure in his mother; Hypertension in his father; Stroke in his father. Mr. Abebe Venegas reports that he has never smoked. He has never used smokeless tobacco. He reports that he does not drink alcohol or use drugs. His current medication list consists of Folinic Acid-Vit B6-Vit B12, Potassium Chloride, levothyroxine, lisinopril-hydroCHLOROthiazide, and metoprolol succinate. Allergies: Patient has no known allergies. Pertinent items from the review of systems are discussed in the HPI; the remainder of the ROS was reviewed and is negative.        Objective:     /66   Pulse 71   Temp 96.9 °F (36.1 °C) (Oral)   Resp 20   Ht 5' 7\" (1.702 m)   Wt (!) 352 lb (159.7 kg)   BMI 55.13 kg/m²   General Appearance: alert and oriented to person, place and time, well developed and well- nourished, in no acute distress  Skin: warm and dry, no rash or erythema  Head: normocephalic and atraumatic  Eyes: pupils equal, round, and reactive to light, extraocular eye movements intact, conjunctivae normal  ENT: tympanic membrane, external ear and ear canal normal bilaterally, nose without deformity, nasal mucosa and turbinates normal without polyps  Neck: supple and non-tender without mass, no thyromegaly or thyroid nodules, no cervical lymphadenopathy  Pulmonary/Chest: clear to auscultation bilaterally- no wheezes, rales or rhonchi, normal air movement, no respiratory ulcer of calf and inflammation ([facility]) I83.222, L97.229    Venous insufficiency I87.2    Essential hypertension I10    Hypothyroidism E03.9    Morbid obesity ([facility]) E66.01    Hypergranulation 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 Factors contributing to occurrence and/or persistence of the chronic ulcer include edema, venous stasis and obesity. Sharp debridement is not indicated today, based upon the exam findings in the ulcer(s) above. Discharge plan:     Treatment in the wound care center today: Wound 01/20/20 #2, Right Lower Leg Cluster, Venous, Partial Thickness, Onset 1/1/20-Dressing/Treatment: Other (comment)(flagyl,silverfoam.interdry,kerramax,compri2)  Wound 01/20/20 #4, Left Lower Leg Cluster, Venous Ulcer, Partial Thickness, Onset 1/1/20-Dressing/Treatment: Other (comment)(flagyl,silverfoam.interdry,opticlock,compri2). Home treatment: good handwashing before and after any dressing changes. Cleanse ulcer with saline or soap & water before dressing change. May use Vaseline (petrolatum), Aquaphor, Aveeno, CeraVe, Cetaphil, Eucerin, Lubriderm, etc for dry skin. Dressing type for home: Per physician order, bilateral application of multilayer compression wrap was performed in the wound care center today, to help manage edema, stasis dermatitis, and/or venous ulcers. Leave primary dressing and multi-layer wrap(s) in place until the next appointment. Extra padding to heel. Written discharge instructions given to patient. Offload ulcer(s) as directed. Elevate leg(s) as directed. Follow up in 1 week.              Electronically signed by Elsa Zeng DPM on 3/16/2020 at 2:08 PM.

## 2020-03-16 NOTE — PLAN OF CARE
Pt to the [facility] for follow up appointment. Wounds improving. Pt to continue with weekly compression wraps. Pt to follow up in the [facility] in 1 week. Discharge instructions reviewed with patient, all questions answered, copy given to patient. Dressings were applied to all wounds per M.D. Instructions at this visit.

## 2020-03-23 ENCOUNTER — HOSPITAL ENCOUNTER (OUTPATIENT)
Dept: WOUND CARE | Age: 60
Discharge: HOME | End: 2020-03-23
Payer: COMMERCIAL

## 2020-03-23 VITALS
BODY MASS INDEX: 49.44 KG/M2 | WEIGHT: 315 LBS | RESPIRATION RATE: 18 BRPM | DIASTOLIC BLOOD PRESSURE: 66 MMHG | TEMPERATURE: 97.3 F | HEART RATE: 81 BPM | HEIGHT: 67 IN | SYSTOLIC BLOOD PRESSURE: 113 MMHG

## 2020-03-23 PROCEDURE — 29581 APPL MULTLAYER CMPRN SYS LEG: CPT

## 2020-03-23 RX ORDER — LIDOCAINE 40 MG/G
CREAM TOPICAL ONCE
Status: DISCONTINUED | OUTPATIENT
Start: 2020-03-23 | End: 2020-03-24 | Stop reason: HOSPADM

## 2020-03-23 ASSESSMENT — PAIN SCALES - GENERAL
PAINLEVEL_OUTOF10: 0
PAINLEVEL_OUTOF10: 0

## 2020-03-23 NOTE — PROGRESS NOTES
respiratory distress  Cardiovascular: normal rate, regular rhythm, normal S1 and S2, no murmurs, rubs, clicks, or gallops, distal pulses intact, no carotid bruits  Abdomen: soft, non-tender, non-distended, normal bowel sounds, no masses or organomegaly. Dorsalis pedis pulse left palpable  Posterior tibial pulse left palpable  Dorsalis pedis pulse right palpable  Posterior tibial pulse right palpable      Ulcer on the bilateral LE with mild fibrotic tissue, red granulation tissue, mild to moderateserous drainage, no hyperkeratotic rim, no undermining, no tunneling, no purulence, no malodor, no eschar, no periwound maceration, mild periwound erythema, mild edema, no crepitus, no increase in skin temperature, ulcers probe to soft tissue only    Ulcer on the plantar aspect left heel with thin epithelial coverage. Mild lysing periwound skin noted. Edema bilateral LE in proximal aspect of lower legs. Today's ulcer measurements are in the wound documentation flowsheet.      Wound measurements:  Wound 01/20/20 #2, Right Lower Leg Cluster, Venous, Partial Thickness, Onset 1/1/20-Wound Length (cm): 6 cm  Wound 01/20/20 #4, Left Lower Leg Cluster, Venous Ulcer, Partial Thickness, Onset 1/1/20-Wound Length (cm): 4.5 cm    Wound 01/20/20 #2, Right Lower Leg Cluster, Venous, Partial Thickness, Onset 1/1/20-Wound Width (cm): 5.5 cm  Wound 01/20/20 #4, Left Lower Leg Cluster, Venous Ulcer, Partial Thickness, Onset 1/1/20-Wound Width (cm): 3 cm    Wound 01/20/20 #2, Right Lower Leg Cluster, Venous, Partial Thickness, Onset 1/1/20-Wound Depth (cm): 0.1 cm  Wound 01/20/20 #4, Left Lower Leg Cluster, Venous Ulcer, Partial Thickness, Onset 1/1/20-Wound Depth (cm): 0.1 cm    LABS  No results found for: LABA1C      Assessment:     Patient Active Problem List   Diagnosis Code    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 Factors contributing to occurrence and/or persistence of the chronic ulcer include edema, venous stasis and obesity. Sharp debridement is not indicated today, based upon the exam findings in the ulcer(s) above. Discharge plan:     Treatment in the wound care center today:  . Home treatment: good handwashing before and after any dressing changes. Cleanse ulcer with saline or soap & water before dressing change. May use Vaseline (petrolatum), Aquaphor, Aveeno, CeraVe, Cetaphil, Eucerin, Lubriderm, etc for dry skin. Dressing type for home: Per physician order, bilateral application of multilayer compression wrap was performed in the wound care center today, to help manage edema, stasis dermatitis, and/or venous ulcers. Leave primary dressing and multi-layer wrap(s) in place until the next appointment. Extra padding to heel. Written discharge instructions given to patient. Offload ulcer(s) as directed. Elevate leg(s) as directed. Follow up in 1 week.              Electronically signed by Jabari Berry DPM on 3/23/2020 at 11:08 AM.

## 2020-03-30 ENCOUNTER — HOSPITAL ENCOUNTER (OUTPATIENT)
Dept: WOUND CARE | Age: 60
Discharge: HOME | End: 2020-03-30
Payer: COMMERCIAL

## 2020-03-30 VITALS
RESPIRATION RATE: 18 BRPM | SYSTOLIC BLOOD PRESSURE: 122 MMHG | DIASTOLIC BLOOD PRESSURE: 73 MMHG | BODY MASS INDEX: 49.44 KG/M2 | TEMPERATURE: 98.1 F | HEART RATE: 80 BPM | WEIGHT: 315 LBS | HEIGHT: 67 IN

## 2020-03-30 PROCEDURE — 29581 APPL MULTLAYER CMPRN SYS LEG: CPT

## 2020-03-30 RX ORDER — LIDOCAINE 40 MG/G
CREAM TOPICAL ONCE
Status: DISCONTINUED | OUTPATIENT
Start: 2020-03-30 | End: 2020-03-31 | Stop reason: HOSPADM

## 2020-03-30 ASSESSMENT — PAIN SCALES - GENERAL
PAINLEVEL_OUTOF10: 0
PAINLEVEL_OUTOF10: 0

## 2020-03-30 NOTE — PROGRESS NOTES
88 [facility] Progress Note      Zarina Dumont     : 1960    DATE OF VISIT:  3/30/2020    Subjective:     Zarina Dumont is a 63 y.o. male who has a chief complaint of a venous ulcer located on the bilateral leg. Dressing slid down on right leg over the weekend. Mr. Gracia Carmichael has a past medical history of Cellulitis, Hypertension, and Osteomyelitis of left foot ([address] He has a past surgical history that includes Toe amputation (Left, 2014). His family history includes Diabetes in his brother; Heart Failure in his mother; Hypertension in his father; Stroke in his father. Mr. Gracia Carmichael reports that he has never smoked. He has never used smokeless tobacco. He reports that he does not drink alcohol or use drugs. His current medication list consists of Folinic Acid-Vit B6-Vit B12, Potassium Chloride, levothyroxine, lisinopril-hydroCHLOROthiazide, and metoprolol succinate. Allergies: Patient has no known allergies. Pertinent items from the review of systems are discussed in the HPI; the remainder of the ROS was reviewed and is negative.        Objective:     /73   Pulse 80   Temp 98.1 °F (36.7 °C) (Oral)   Resp 18   Ht 5' 7\" (1.702 m)   Wt (!) 343 lb (155.6 kg)   BMI 53.72 kg/m²   General Appearance: alert and oriented to person, place and time, well developed and well- nourished, in no acute distress  Skin: warm and dry, no rash or erythema  Head: normocephalic and atraumatic  Eyes: pupils equal, round, and reactive to light, extraocular eye movements intact, conjunctivae normal  ENT: tympanic membrane, external ear and ear canal normal bilaterally, nose without deformity, nasal mucosa and turbinates normal without polyps  Neck: supple and non-tender without mass, no thyromegaly or thyroid nodules, no cervical lymphadenopathy  Pulmonary/Chest: clear to auscultation bilaterally- no wheezes, rales or rhonchi, normal air movement, no respiratory distress  Cardiovascular: normal rate, regular rhythm, normal S1 and S2, no murmurs, rubs, clicks, or gallops, distal pulses intact, no carotid bruits  Abdomen: soft, non-tender, non-distended, normal bowel sounds, no masses or organomegaly. Dorsalis pedis pulse left palpable  Posterior tibial pulse left palpable  Dorsalis pedis pulse right palpable  Posterior tibial pulse right palpable      Ulcer on the bilateral LE with mild fibrotic tissue, red granulation tissue, mild to moderateserous drainage, no hyperkeratotic rim, no undermining, no tunneling, no purulence, no malodor, no eschar, no periwound maceration, mild periwound erythema, mild edema, no crepitus, no increase in skin temperature, ulcers probe to soft tissue only    Ulcer on the plantar aspect left heel with thin epithelial coverage. Mild lysing periwound skin noted. Edema bilateral LE in proximal aspect of lower legs. Today's ulcer measurements are in the wound documentation flowsheet.      Wound measurements:  Wound 01/20/20 #2, Right Lower Leg Cluster, Venous, Partial Thickness, Onset 1/1/20-Wound Length (cm): 1.8 cm  Wound 01/20/20 #4, Left Lower Leg Cluster, Venous Ulcer, Partial Thickness, Onset 1/1/20-Wound Length (cm): 0.1 cm    Wound 01/20/20 #2, Right Lower Leg Cluster, Venous, Partial Thickness, Onset 1/1/20-Wound Width (cm): 0.5 cm  Wound 01/20/20 #4, Left Lower Leg Cluster, Venous Ulcer, Partial Thickness, Onset 1/1/20-Wound Width (cm): 0.1 cm    Wound 01/20/20 #2, Right Lower Leg Cluster, Venous, Partial Thickness, Onset 1/1/20-Wound Depth (cm): 0.1 cm  Wound 01/20/20 #4, Left Lower Leg Cluster, Venous Ulcer, Partial Thickness, Onset 1/1/20-Wound Depth (cm): 0.1 cm    LABS  No results found for: LABA1C      Assessment:     Patient Active Problem List   Diagnosis Code    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 Factors contributing to occurrence and/or persistence of the chronic ulcer include edema, venous stasis and obesity. Sharp debridement is not indicated today, based upon the exam findings in the ulcer(s) above. Discharge plan:     Treatment in the wound care center today:  . Home treatment: good handwashing before and after any dressing changes. Cleanse ulcer with saline or soap & water before dressing change. May use Vaseline (petrolatum), Aquaphor, Aveeno, CeraVe, Cetaphil, Eucerin, Lubriderm, etc for dry skin. Dressing type for home: Per physician order, bilateral application of multilayer compression wrap was performed in the wound care center today, to help manage edema, stasis dermatitis, and/or venous ulcers. Leave primary dressing and multi-layer wrap(s) in place until the next appointment. Extra padding to heel. Written discharge instructions given to patient. Offload ulcer(s) as directed. Elevate leg(s) as directed. Follow up in 1 week. Dressing slid down on right leg creating increased edema proximally. However overall the leg wound improved. Patient knows to contact the clinic if dressing slides down during the week.        Electronically signed by Bill Barnes DPM on 3/30/2020 at 10:30 AM.

## 2020-04-06 ENCOUNTER — HOSPITAL ENCOUNTER (OUTPATIENT)
Dept: WOUND CARE | Age: 60
Discharge: HOME | End: 2020-04-06
Payer: COMMERCIAL

## 2020-04-06 VITALS
SYSTOLIC BLOOD PRESSURE: 139 MMHG | HEIGHT: 67 IN | DIASTOLIC BLOOD PRESSURE: 76 MMHG | HEART RATE: 86 BPM | BODY MASS INDEX: 49.44 KG/M2 | WEIGHT: 315 LBS | TEMPERATURE: 97.9 F | RESPIRATION RATE: 20 BRPM

## 2020-04-06 PROCEDURE — 29581 APPL MULTLAYER CMPRN SYS LEG: CPT

## 2020-04-06 RX ORDER — LIDOCAINE 40 MG/G
CREAM TOPICAL ONCE
Status: DISCONTINUED | OUTPATIENT
Start: 2020-04-06 | End: 2020-04-07 | Stop reason: HOSPADM

## 2020-04-06 ASSESSMENT — PAIN SCALES - GENERAL: PAINLEVEL_OUTOF10: 0

## 2020-04-13 ENCOUNTER — HOSPITAL ENCOUNTER (OUTPATIENT)
Dept: WOUND CARE | Age: 60
Discharge: HOME | End: 2020-04-13
Payer: COMMERCIAL

## 2020-04-13 VITALS
HEIGHT: 67 IN | BODY MASS INDEX: 49.44 KG/M2 | TEMPERATURE: 98.6 F | DIASTOLIC BLOOD PRESSURE: 83 MMHG | RESPIRATION RATE: 21 BRPM | SYSTOLIC BLOOD PRESSURE: 142 MMHG | HEART RATE: 88 BPM | WEIGHT: 315 LBS

## 2020-04-13 PROCEDURE — 29581 APPL MULTLAYER CMPRN SYS LEG: CPT

## 2020-04-13 RX ORDER — LIDOCAINE 40 MG/G
CREAM TOPICAL ONCE
Status: DISCONTINUED | OUTPATIENT
Start: 2020-04-13 | End: 2020-04-14 | Stop reason: HOSPADM

## 2020-04-13 ASSESSMENT — PAIN SCALES - GENERAL
PAINLEVEL_OUTOF10: 0
PAINLEVEL_OUTOF10: 0

## 2020-04-13 NOTE — PROGRESS NOTES
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 Factors contributing to occurrence and/or persistence of the chronic ulcer include edema, venous stasis and obesity. Sharp debridement is not indicated today, based upon the exam findings in the ulcer(s) above. Discharge plan:     Treatment in the wound care center today: [REMOVED] Wound 01/20/20 #2, Right Lower Leg Cluster, Venous, Partial Thickness, Onset 1/1/20-Dressing/Treatment: Other (comment)(triamcinalone to red, ABD to heel, compri 2)  [REMOVED] Wound 01/20/20 #4, Left Lower Leg Cluster, Venous Ulcer, Partial Thickness, Onset 1/1/20-Dressing/Treatment: Other (comment)(triamcinalone to red, compri 2)  Wound 04/13/20 #5, Left great toe (below nail bed), trauma, partial thickness, onset 4/12/20-Dressing/Treatment: Other (comment)(betadine, dry dressing). Home treatment: good handwashing before and after any dressing changes. Cleanse ulcer with saline or soap & water before dressing change. May use Vaseline (petrolatum), Aquaphor, Aveeno, CeraVe, Cetaphil, Eucerin, Lubriderm, etc for dry skin. Dressing type for home: Per physician order, bilateral application of multilayer compression wrap was performed in the wound care center today, to help manage edema, stasis dermatitis, and/or venous ulcers.  Leave primary dressing and multi-layer wrap(s) in place until the next appointment. Betadine and dry dressing to the left hallux daily. Extra padding to heel. Written discharge instructions given to patient. Offload ulcer(s) as directed. Elevate leg(s) as directed. Follow up in 1 week.            Electronically signed by Satnam Campos DPM on 4/13/2020 at 12:00 PM.

## 2020-04-20 ENCOUNTER — HOSPITAL ENCOUNTER (OUTPATIENT)
Dept: WOUND CARE | Age: 60
Discharge: HOME | End: 2020-04-20
Payer: COMMERCIAL

## 2020-04-20 VITALS
TEMPERATURE: 97.9 F | HEART RATE: 89 BPM | RESPIRATION RATE: 18 BRPM | DIASTOLIC BLOOD PRESSURE: 65 MMHG | HEIGHT: 67 IN | BODY MASS INDEX: 49.44 KG/M2 | SYSTOLIC BLOOD PRESSURE: 120 MMHG | WEIGHT: 315 LBS

## 2020-04-20 PROCEDURE — 29581 APPL MULTLAYER CMPRN SYS LEG: CPT

## 2020-04-20 ASSESSMENT — PAIN SCALES - GENERAL
PAINLEVEL_OUTOF10: 0
PAINLEVEL_OUTOF10: 0

## 2020-04-20 NOTE — PROGRESS NOTES
88 [facility] Progress Note      Chula Mckinney     : 1960    DATE OF VISIT:  2020    Subjective:     Chula Mckinney is a 63 y.o. male who has a chief complaint of a venous ulcer located on the bilateral leg. Doing well with compression wraps. States left great toe wound looks great. Mr. Madelaine Villatoro has a past medical history of Cellulitis, Hypertension, and Osteomyelitis of left foot ([address] He has a past surgical history that includes Toe amputation (Left, 2014). His family history includes Diabetes in his brother; Heart Failure in his mother; Hypertension in his father; Stroke in his father. Mr. Madelaine Villatoro reports that he has never smoked. He has never used smokeless tobacco. He reports that he does not drink alcohol or use drugs. His current medication list consists of Folinic Acid-Vit B6-Vit B12, Potassium Chloride, levothyroxine, lisinopril-hydroCHLOROthiazide, and metoprolol succinate. Allergies: Patient has no known allergies. Pertinent items from the review of systems are discussed in the HPI; the remainder of the ROS was reviewed and is negative.        Objective:     /65   Pulse 89   Temp 97.9 °F (36.6 °C) (Oral)   Resp 18   Ht 5' 7\" (1.702 m)   Wt (!) 343 lb 12.8 oz (155.9 kg)   BMI 53.85 kg/m²   General Appearance: alert and oriented to person, place and time, well developed and well- nourished, in no acute distress  Skin: warm and dry, no rash or erythema  Head: normocephalic and atraumatic  Eyes: pupils equal, round, and reactive to light, extraocular eye movements intact, conjunctivae normal  ENT: tympanic membrane, external ear and ear canal normal bilaterally, nose without deformity, nasal mucosa and turbinates normal without polyps  Neck: supple and non-tender without mass, no thyromegaly or thyroid nodules, no cervical lymphadenopathy  Pulmonary/Chest: clear to auscultation bilaterally- no wheezes, rales or rhonchi, normal air movement, no respiratory distress  Cardiovascular: normal rate, regular rhythm, normal S1 and S2, no murmurs, rubs, clicks, or gallops, distal pulses intact, no carotid bruits  Abdomen: soft, non-tender, non-distended, normal bowel sounds, no masses or organomegaly. Dorsalis pedis pulse left palpable  Posterior tibial pulse left palpable  Dorsalis pedis pulse right palpable  Posterior tibial pulse right palpable      Ulcer on the bilateral LE with thin epithelial tissue noted. Left hallux ulcer epithelialized. No drainage noted. Ulcer on the plantar aspect left heel with epithelial coverage. Edema bilateral LE in proximal aspect of lower legs. Today's ulcer measurements are in the wound documentation flowsheet. 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 Factors contributing to occurrence and/or persistence of the chronic ulcer include edema, venous stasis and obesity.  Sharp debridement is not indicated today, based upon the exam findings in the ulcer(s) above. Discharge plan:     Treatment in the wound care center today: Wound 04/13/20 #5, Left great toe (below nail bed), trauma, partial thickness, onset 4/12/20-Dressing/Treatment: Other (comment)(triamcinolone,compri2,spandagrip). Home treatment: good handwashing before and after any dressing changes. Cleanse ulcer with saline or soap & water before dressing change. May use Vaseline (petrolatum), Aquaphor, Aveeno, CeraVe, Cetaphil, Eucerin, Lubriderm, etc for dry skin. Dressing type for home: Per physician order, bilateral application of multilayer compression wrap was performed in the wound care center today, to help manage edema, stasis dermatitis, and/or venous ulcers. Leave primary dressing and multi-layer wrap(s) in place until the next appointment. Extra padding to heel. Written discharge instructions given to patient. Offload ulcer(s) as directed. Elevate leg(s) as directed. Follow up in 1 week. Continued wraps in order to allow skin to strengthen.          Electronically signed by Diamond Wise DPM on 4/20/2020 at 9:05 AM.

## 2020-04-27 ENCOUNTER — HOSPITAL ENCOUNTER (OUTPATIENT)
Dept: WOUND CARE | Age: 60
Discharge: HOME | End: 2020-04-27
Payer: COMMERCIAL

## 2020-04-27 VITALS
WEIGHT: 315 LBS | SYSTOLIC BLOOD PRESSURE: 125 MMHG | DIASTOLIC BLOOD PRESSURE: 71 MMHG | HEART RATE: 81 BPM | HEIGHT: 67 IN | TEMPERATURE: 98.1 F | RESPIRATION RATE: 18 BRPM | BODY MASS INDEX: 49.44 KG/M2

## 2020-04-27 PROCEDURE — 99212 OFFICE O/P EST SF 10 MIN: CPT

## 2020-04-27 RX ORDER — LIDOCAINE 40 MG/G
CREAM TOPICAL ONCE
Status: DISCONTINUED | OUTPATIENT
Start: 2020-04-27 | End: 2020-04-27

## 2020-04-27 ASSESSMENT — PAIN SCALES - GENERAL
PAINLEVEL_OUTOF10: 0
PAINLEVEL_OUTOF10: 0

## 2020-04-27 NOTE — PLAN OF CARE
Pt to the [facility] for follow up appointment. Wound healed. Pt to wear compression garments on bilateral lower legs. Pt to follow up in the [facility] as needed and in [facility] office for routine foot care. Discharge instructions reviewed with patient, all questions answered, copy given to patient. Dressings were applied to all wounds per M.D. Instructions at this visit.